# Patient Record
Sex: MALE | Race: WHITE | NOT HISPANIC OR LATINO | Employment: FULL TIME | URBAN - METROPOLITAN AREA
[De-identification: names, ages, dates, MRNs, and addresses within clinical notes are randomized per-mention and may not be internally consistent; named-entity substitution may affect disease eponyms.]

---

## 2018-08-22 ENCOUNTER — OFFICE VISIT (OUTPATIENT)
Dept: FAMILY MEDICINE | Age: 38
End: 2018-08-22

## 2018-08-22 VITALS
SYSTOLIC BLOOD PRESSURE: 110 MMHG | DIASTOLIC BLOOD PRESSURE: 80 MMHG | HEIGHT: 71 IN | HEART RATE: 104 BPM | BODY MASS INDEX: 32.78 KG/M2 | WEIGHT: 234.13 LBS

## 2018-08-22 DIAGNOSIS — F51.04 CHRONIC INSOMNIA: ICD-10-CM

## 2018-08-22 DIAGNOSIS — I10 ESSENTIAL HYPERTENSION: ICD-10-CM

## 2018-08-22 DIAGNOSIS — Z13.220 SCREENING CHOLESTEROL LEVEL: ICD-10-CM

## 2018-08-22 DIAGNOSIS — Z00.00 ROUTINE GENERAL MEDICAL EXAMINATION AT A HEALTH CARE FACILITY: Primary | ICD-10-CM

## 2018-08-22 PROCEDURE — 99385 PREV VISIT NEW AGE 18-39: CPT | Performed by: FAMILY MEDICINE

## 2018-08-22 RX ORDER — ERGOCALCIFEROL 1.25 MG/1
5000 CAPSULE ORAL DAILY
COMMUNITY
End: 2018-12-12 | Stop reason: ALTCHOICE

## 2018-08-22 RX ORDER — AMITRIPTYLINE HYDROCHLORIDE 25 MG/1
25 TABLET, FILM COATED ORAL NIGHTLY
COMMUNITY
End: 2018-09-19 | Stop reason: SDUPTHER

## 2018-08-22 RX ORDER — LISINOPRIL 10 MG/1
10 TABLET ORAL DAILY
COMMUNITY
End: 2019-04-30 | Stop reason: SDUPTHER

## 2018-08-22 RX ORDER — PAROXETINE 10 MG/1
10 TABLET, FILM COATED ORAL EVERY MORNING
Qty: 30 TABLET | Refills: 0 | Status: SHIPPED | OUTPATIENT
Start: 2018-08-22 | End: 2018-09-19 | Stop reason: SDUPTHER

## 2018-08-22 RX ORDER — MAGNESIUM GLUCONATE 27 MG(500)
500 TABLET ORAL 2 TIMES DAILY
COMMUNITY
End: 2018-12-12 | Stop reason: CLARIF

## 2018-08-29 ENCOUNTER — LAB SERVICES (OUTPATIENT)
Dept: LAB | Age: 38
End: 2018-08-29

## 2018-08-29 DIAGNOSIS — Z13.220 SCREENING CHOLESTEROL LEVEL: ICD-10-CM

## 2018-08-29 DIAGNOSIS — I10 ESSENTIAL HYPERTENSION: ICD-10-CM

## 2018-08-29 LAB
ALBUMIN SERPL-MCNC: 4 G/DL (ref 3.6–5.1)
ALBUMIN/GLOB SERPL: 1.2 {RATIO} (ref 1–2.4)
ALP SERPL-CCNC: 96 UNITS/L (ref 45–117)
ALT SERPL-CCNC: 70 UNITS/L
ANION GAP SERPL CALC-SCNC: 12 MMOL/L (ref 10–20)
APPEARANCE UR: CLEAR
AST SERPL-CCNC: 27 UNITS/L
BILIRUB SERPL-MCNC: 0.6 MG/DL (ref 0.2–1)
BILIRUB UR QL STRIP: NEGATIVE
BUN SERPL-MCNC: 16 MG/DL (ref 6–20)
BUN/CREAT SERPL: 16 (ref 7–25)
CALCIUM SERPL-MCNC: 8.8 MG/DL (ref 8.4–10.2)
CHLORIDE SERPL-SCNC: 104 MMOL/L (ref 98–107)
CHOLEST SERPL-MCNC: 245 MG/DL
CHOLEST/HDLC SERPL: 5.8 {RATIO}
CO2 SERPL-SCNC: 28 MMOL/L (ref 21–32)
COLOR UR: YELLOW
CREAT SERPL-MCNC: 1.03 MG/DL (ref 0.67–1.17)
FASTING STATUS PATIENT QL REPORTED: 12 HRS
GLOBULIN SER-MCNC: 3.2 G/DL (ref 2–4)
GLUCOSE SERPL-MCNC: 86 MG/DL (ref 65–99)
GLUCOSE UR STRIP-MCNC: NEGATIVE MG/DL
HDLC SERPL-MCNC: 42 MG/DL
HGB UR QL STRIP: NEGATIVE
KETONES UR STRIP-MCNC: NEGATIVE MG/DL
LDLC SERPL-MCNC: 166 MG/DL
LENGTH OF FAST TIME PATIENT: 12 HRS
LEUKOCYTE ESTERASE UR QL STRIP: NEGATIVE
NITRITE UR QL STRIP: NEGATIVE
NONHDLC SERPL-MCNC: 203 MG/DL
PH UR STRIP: 7 UNITS (ref 5–7)
POTASSIUM SERPL-SCNC: 4.5 MMOL/L (ref 3.4–5.1)
PROT SERPL-MCNC: 7.2 G/DL (ref 6.4–8.2)
PROT UR STRIP-MCNC: NEGATIVE MG/DL
SODIUM SERPL-SCNC: 139 MMOL/L (ref 135–145)
SP GR UR STRIP: 1.02 (ref 1–1.03)
SPECIMEN SOURCE: NORMAL
TRIGL SERPL-MCNC: 184 MG/DL
TSH SERPL-ACNC: 1.27 MCUNITS/ML (ref 0.35–5)
UROBILINOGEN UR STRIP-MCNC: 0.2 MG/DL (ref 0–1)

## 2018-08-29 PROCEDURE — 81003 URINALYSIS AUTO W/O SCOPE: CPT | Performed by: INTERNAL MEDICINE

## 2018-08-29 PROCEDURE — 36415 COLL VENOUS BLD VENIPUNCTURE: CPT | Performed by: INTERNAL MEDICINE

## 2018-09-19 ENCOUNTER — OFFICE VISIT (OUTPATIENT)
Dept: FAMILY MEDICINE | Age: 38
End: 2018-09-19

## 2018-09-19 VITALS
SYSTOLIC BLOOD PRESSURE: 106 MMHG | BODY MASS INDEX: 32.49 KG/M2 | WEIGHT: 234.57 LBS | HEART RATE: 100 BPM | DIASTOLIC BLOOD PRESSURE: 80 MMHG

## 2018-09-19 DIAGNOSIS — F51.04 CHRONIC INSOMNIA: ICD-10-CM

## 2018-09-19 DIAGNOSIS — I10 ESSENTIAL HYPERTENSION: Primary | ICD-10-CM

## 2018-09-19 DIAGNOSIS — E78.00 PURE HYPERCHOLESTEROLEMIA: ICD-10-CM

## 2018-09-19 PROCEDURE — 99213 OFFICE O/P EST LOW 20 MIN: CPT | Performed by: FAMILY MEDICINE

## 2018-09-19 RX ORDER — AMITRIPTYLINE HYDROCHLORIDE 100 MG/1
100 TABLET ORAL NIGHTLY
Qty: 90 TABLET | Refills: 0 | Status: SHIPPED | OUTPATIENT
Start: 2018-09-19 | End: 2018-11-19 | Stop reason: SDUPTHER

## 2018-09-19 RX ORDER — PAROXETINE HYDROCHLORIDE 20 MG/1
20 TABLET, FILM COATED ORAL EVERY MORNING
Qty: 30 TABLET | Refills: 2 | Status: SHIPPED | OUTPATIENT
Start: 2018-09-19 | End: 2018-12-12 | Stop reason: ALTCHOICE

## 2018-09-26 ENCOUNTER — WALK IN (OUTPATIENT)
Dept: URGENT CARE | Age: 38
End: 2018-09-26

## 2018-09-26 VITALS
WEIGHT: 235.89 LBS | HEART RATE: 108 BPM | OXYGEN SATURATION: 97 % | TEMPERATURE: 97.5 F | BODY MASS INDEX: 32.67 KG/M2 | DIASTOLIC BLOOD PRESSURE: 79 MMHG | SYSTOLIC BLOOD PRESSURE: 130 MMHG

## 2018-09-26 DIAGNOSIS — L03.011 PARONYCHIA OF FINGER OF RIGHT HAND: Primary | ICD-10-CM

## 2018-09-26 PROCEDURE — 99213 OFFICE O/P EST LOW 20 MIN: CPT | Performed by: NURSE PRACTITIONER

## 2018-09-26 RX ORDER — AMOXICILLIN AND CLAVULANATE POTASSIUM 875; 125 MG/1; MG/1
1 TABLET, FILM COATED ORAL 2 TIMES DAILY
Qty: 20 TABLET | Refills: 0 | Status: SHIPPED | OUTPATIENT
Start: 2018-09-26 | End: 2018-12-12 | Stop reason: CLARIF

## 2018-11-20 RX ORDER — AMITRIPTYLINE HYDROCHLORIDE 100 MG/1
100 TABLET ORAL NIGHTLY
Qty: 90 TABLET | Refills: 0 | Status: SHIPPED | OUTPATIENT
Start: 2018-11-20 | End: 2018-12-12 | Stop reason: SDUPTHER

## 2018-12-12 ENCOUNTER — OFFICE VISIT (OUTPATIENT)
Dept: FAMILY MEDICINE | Age: 38
End: 2018-12-12

## 2018-12-12 VITALS
BODY MASS INDEX: 33.92 KG/M2 | DIASTOLIC BLOOD PRESSURE: 80 MMHG | SYSTOLIC BLOOD PRESSURE: 110 MMHG | WEIGHT: 244.93 LBS | HEART RATE: 92 BPM

## 2018-12-12 DIAGNOSIS — I10 ESSENTIAL HYPERTENSION: Primary | ICD-10-CM

## 2018-12-12 DIAGNOSIS — F51.04 CHRONIC INSOMNIA: ICD-10-CM

## 2018-12-12 PROCEDURE — 99213 OFFICE O/P EST LOW 20 MIN: CPT | Performed by: FAMILY MEDICINE

## 2018-12-12 RX ORDER — CHOLECALCIFEROL (VITAMIN D3) 50 MCG
2000 TABLET ORAL DAILY
Qty: 100 TABLET | Refills: 0 | Status: SHIPPED | COMMUNITY
End: 2019-12-13 | Stop reason: ALTCHOICE

## 2018-12-12 RX ORDER — ESZOPICLONE 2 MG/1
2 TABLET, FILM COATED ORAL
Qty: 30 TABLET | Refills: 0 | Status: SHIPPED | OUTPATIENT
Start: 2018-12-12 | End: 2019-01-14 | Stop reason: SDUPTHER

## 2018-12-12 RX ORDER — AMITRIPTYLINE HYDROCHLORIDE 50 MG/1
50 TABLET, FILM COATED ORAL NIGHTLY
Qty: 30 TABLET | Refills: 5 | Status: SHIPPED | OUTPATIENT
Start: 2018-12-12 | End: 2019-06-12 | Stop reason: ALTCHOICE

## 2018-12-12 RX ORDER — ESCITALOPRAM OXALATE 10 MG/1
10 TABLET ORAL DAILY
Qty: 30 TABLET | Refills: 5 | Status: SHIPPED | OUTPATIENT
Start: 2018-12-12 | End: 2019-02-08 | Stop reason: SDUPTHER

## 2019-01-01 ENCOUNTER — EXTERNAL RECORD (OUTPATIENT)
Dept: OTHER | Age: 39
End: 2019-01-01

## 2019-01-14 ENCOUNTER — TELEPHONE (OUTPATIENT)
Dept: FAMILY MEDICINE | Age: 39
End: 2019-01-14

## 2019-01-14 RX ORDER — ESZOPICLONE 2 MG/1
2 TABLET, FILM COATED ORAL
Qty: 30 TABLET | Refills: 1 | Status: SHIPPED | OUTPATIENT
Start: 2019-01-14 | End: 2019-03-14 | Stop reason: SDUPTHER

## 2019-01-22 RX ORDER — ESZOPICLONE 2 MG/1
TABLET, FILM COATED ORAL
Qty: 30 TABLET | Refills: 0 | OUTPATIENT
Start: 2019-01-22

## 2019-02-07 ENCOUNTER — TELEPHONE (OUTPATIENT)
Dept: FAMILY MEDICINE | Age: 39
End: 2019-02-07

## 2019-02-08 RX ORDER — ESCITALOPRAM OXALATE 20 MG/1
20 TABLET ORAL DAILY
Qty: 30 TABLET | Refills: 2 | Status: SHIPPED | OUTPATIENT
Start: 2019-02-08 | End: 2019-05-13 | Stop reason: SDUPTHER

## 2019-03-12 ENCOUNTER — TELEPHONE (OUTPATIENT)
Dept: FAMILY MEDICINE | Age: 39
End: 2019-03-12

## 2019-03-14 ENCOUNTER — OFFICE VISIT (OUTPATIENT)
Dept: FAMILY MEDICINE | Age: 39
End: 2019-03-14

## 2019-03-14 ENCOUNTER — IMAGING SERVICES (OUTPATIENT)
Dept: GENERAL RADIOLOGY | Age: 39
End: 2019-03-14
Attending: FAMILY MEDICINE

## 2019-03-14 VITALS
WEIGHT: 234.13 LBS | HEART RATE: 96 BPM | DIASTOLIC BLOOD PRESSURE: 86 MMHG | BODY MASS INDEX: 32.43 KG/M2 | SYSTOLIC BLOOD PRESSURE: 120 MMHG

## 2019-03-14 DIAGNOSIS — G89.29 CHRONIC PAIN OF LEFT ELBOW: ICD-10-CM

## 2019-03-14 DIAGNOSIS — M25.522 CHRONIC PAIN OF LEFT ELBOW: ICD-10-CM

## 2019-03-14 DIAGNOSIS — F51.04 CHRONIC INSOMNIA: ICD-10-CM

## 2019-03-14 DIAGNOSIS — M25.522 CHRONIC PAIN OF LEFT ELBOW: Primary | ICD-10-CM

## 2019-03-14 DIAGNOSIS — G89.29 CHRONIC PAIN OF LEFT ELBOW: Primary | ICD-10-CM

## 2019-03-14 PROCEDURE — 99213 OFFICE O/P EST LOW 20 MIN: CPT | Performed by: FAMILY MEDICINE

## 2019-03-14 PROCEDURE — 73080 X-RAY EXAM OF ELBOW: CPT | Performed by: RADIOLOGY

## 2019-03-14 RX ORDER — ESZOPICLONE 3 MG/1
3 TABLET, FILM COATED ORAL
Qty: 30 TABLET | Refills: 0 | Status: SHIPPED | OUTPATIENT
Start: 2019-03-14 | End: 2019-04-11 | Stop reason: SDUPTHER

## 2019-03-14 ASSESSMENT — PATIENT HEALTH QUESTIONNAIRE - PHQ9
1. LITTLE INTEREST OR PLEASURE IN DOING THINGS: NOT AT ALL
2. FEELING DOWN, DEPRESSED OR HOPELESS: NOT AT ALL
SUM OF ALL RESPONSES TO PHQ9 QUESTIONS 1 AND 2: 0
SUM OF ALL RESPONSES TO PHQ9 QUESTIONS 1 AND 2: 0

## 2019-04-01 ENCOUNTER — TELEPHONE (OUTPATIENT)
Dept: FAMILY MEDICINE | Age: 39
End: 2019-04-01

## 2019-04-01 DIAGNOSIS — M77.8 TENDINITIS OF ELBOW: Primary | ICD-10-CM

## 2019-04-10 ENCOUNTER — APPOINTMENT (OUTPATIENT)
Dept: REHABILITATION | Age: 39
End: 2019-04-10
Attending: FAMILY MEDICINE

## 2019-04-11 RX ORDER — ESZOPICLONE 3 MG/1
3 TABLET, FILM COATED ORAL
Qty: 30 TABLET | Refills: 1 | Status: SHIPPED | OUTPATIENT
Start: 2019-04-11 | End: 2019-06-12

## 2019-04-12 ENCOUNTER — APPOINTMENT (OUTPATIENT)
Dept: REHABILITATION | Age: 39
End: 2019-04-12
Attending: FAMILY MEDICINE

## 2019-04-15 ENCOUNTER — APPOINTMENT (OUTPATIENT)
Dept: REHABILITATION | Age: 39
End: 2019-04-15
Attending: FAMILY MEDICINE

## 2019-04-18 ENCOUNTER — APPOINTMENT (OUTPATIENT)
Dept: REHABILITATION | Age: 39
End: 2019-04-18
Attending: FAMILY MEDICINE

## 2019-04-30 RX ORDER — LISINOPRIL 10 MG/1
10 TABLET ORAL DAILY
Qty: 30 TABLET | Refills: 2 | Status: SHIPPED | OUTPATIENT
Start: 2019-04-30 | End: 2019-07-27 | Stop reason: SDUPTHER

## 2019-05-13 RX ORDER — ESCITALOPRAM OXALATE 20 MG/1
20 TABLET ORAL DAILY
Qty: 30 TABLET | Refills: 0 | Status: SHIPPED | OUTPATIENT
Start: 2019-05-13 | End: 2019-06-12

## 2019-05-31 ENCOUNTER — HOSPITAL ENCOUNTER (OUTPATIENT)
Dept: REHABILITATION | Age: 39
Discharge: STILL ADMITTED OR ADMINISTRATIVE | End: 2019-05-31
Attending: FAMILY MEDICINE

## 2019-05-31 PROCEDURE — 10004172 HB COUNTER-THERAPY VISIT OT: Performed by: OCCUPATIONAL THERAPIST

## 2019-05-31 PROCEDURE — 97166 OT EVAL MOD COMPLEX 45 MIN: CPT | Performed by: OCCUPATIONAL THERAPIST

## 2019-06-04 ENCOUNTER — HOSPITAL ENCOUNTER (OUTPATIENT)
Dept: REHABILITATION | Age: 39
Discharge: STILL ADMITTED OR ADMINISTRATIVE | End: 2019-06-04
Attending: FAMILY MEDICINE

## 2019-06-04 PROCEDURE — 97033 APP MDLTY 1+IONTPHRSIS EA 15: CPT | Performed by: OCCUPATIONAL THERAPIST

## 2019-06-04 PROCEDURE — 10004172 HB COUNTER-THERAPY VISIT OT: Performed by: OCCUPATIONAL THERAPIST

## 2019-06-04 PROCEDURE — 97110 THERAPEUTIC EXERCISES: CPT | Performed by: OCCUPATIONAL THERAPIST

## 2019-06-04 PROCEDURE — 97035 APP MDLTY 1+ULTRASOUND EA 15: CPT | Performed by: OCCUPATIONAL THERAPIST

## 2019-06-04 PROCEDURE — 97140 MANUAL THERAPY 1/> REGIONS: CPT | Performed by: OCCUPATIONAL THERAPIST

## 2019-06-06 ENCOUNTER — LAB SERVICES (OUTPATIENT)
Dept: LAB | Age: 39
End: 2019-06-06

## 2019-06-06 ENCOUNTER — HOSPITAL ENCOUNTER (OUTPATIENT)
Dept: REHABILITATION | Age: 39
Discharge: STILL ADMITTED OR ADMINISTRATIVE | End: 2019-06-06
Attending: FAMILY MEDICINE

## 2019-06-06 DIAGNOSIS — I10 ESSENTIAL HYPERTENSION: ICD-10-CM

## 2019-06-06 LAB
ANION GAP SERPL CALC-SCNC: 13 MMOL/L (ref 10–20)
APPEARANCE UR: CLEAR
BILIRUB UR QL STRIP: NEGATIVE
BUN SERPL-MCNC: 14 MG/DL (ref 6–20)
BUN/CREAT SERPL: 14 (ref 7–25)
CALCIUM SERPL-MCNC: 9.5 MG/DL (ref 8.4–10.2)
CHLORIDE SERPL-SCNC: 105 MMOL/L (ref 98–107)
CHOLEST SERPL-MCNC: 248 MG/DL
CHOLEST/HDLC SERPL: 5.6 {RATIO}
CO2 SERPL-SCNC: 28 MMOL/L (ref 21–32)
COLOR UR: YELLOW
CREAT SERPL-MCNC: 1.02 MG/DL (ref 0.67–1.17)
FASTING STATUS PATIENT QL REPORTED: 13 HRS
GLUCOSE SERPL-MCNC: 90 MG/DL (ref 65–99)
GLUCOSE UR STRIP-MCNC: NEGATIVE MG/DL
HDLC SERPL-MCNC: 44 MG/DL
HGB UR QL STRIP: NEGATIVE
KETONES UR STRIP-MCNC: NEGATIVE MG/DL
LDLC SERPL-MCNC: 173 MG/DL
LENGTH OF FAST TIME PATIENT: 13 HRS
LEUKOCYTE ESTERASE UR QL STRIP: NEGATIVE
NITRITE UR QL STRIP: NEGATIVE
NONHDLC SERPL-MCNC: 204 MG/DL
PH UR STRIP: 5.5 UNITS (ref 5–7)
POTASSIUM SERPL-SCNC: 4.4 MMOL/L (ref 3.4–5.1)
PROT UR STRIP-MCNC: NEGATIVE MG/DL
SODIUM SERPL-SCNC: 142 MMOL/L (ref 135–145)
SP GR UR STRIP: 1.02 (ref 1–1.03)
SPECIMEN SOURCE: NORMAL
TRIGL SERPL-MCNC: 156 MG/DL
UROBILINOGEN UR STRIP-MCNC: 0.2 MG/DL (ref 0–1)

## 2019-06-06 PROCEDURE — 97033 APP MDLTY 1+IONTPHRSIS EA 15: CPT | Performed by: OCCUPATIONAL THERAPIST

## 2019-06-06 PROCEDURE — 97110 THERAPEUTIC EXERCISES: CPT | Performed by: OCCUPATIONAL THERAPIST

## 2019-06-06 PROCEDURE — 10004172 HB COUNTER-THERAPY VISIT OT: Performed by: OCCUPATIONAL THERAPIST

## 2019-06-06 PROCEDURE — 36415 COLL VENOUS BLD VENIPUNCTURE: CPT | Performed by: INTERNAL MEDICINE

## 2019-06-06 PROCEDURE — 97140 MANUAL THERAPY 1/> REGIONS: CPT | Performed by: OCCUPATIONAL THERAPIST

## 2019-06-06 PROCEDURE — 97035 APP MDLTY 1+ULTRASOUND EA 15: CPT | Performed by: OCCUPATIONAL THERAPIST

## 2019-06-11 ENCOUNTER — HOSPITAL ENCOUNTER (OUTPATIENT)
Dept: REHABILITATION | Age: 39
Discharge: STILL ADMITTED OR ADMINISTRATIVE | End: 2019-06-11
Attending: FAMILY MEDICINE

## 2019-06-11 PROCEDURE — 97110 THERAPEUTIC EXERCISES: CPT | Performed by: OCCUPATIONAL THERAPIST

## 2019-06-11 PROCEDURE — 97140 MANUAL THERAPY 1/> REGIONS: CPT | Performed by: OCCUPATIONAL THERAPIST

## 2019-06-11 PROCEDURE — 97033 APP MDLTY 1+IONTPHRSIS EA 15: CPT | Performed by: OCCUPATIONAL THERAPIST

## 2019-06-11 PROCEDURE — 97035 APP MDLTY 1+ULTRASOUND EA 15: CPT | Performed by: OCCUPATIONAL THERAPIST

## 2019-06-11 PROCEDURE — 10004172 HB COUNTER-THERAPY VISIT OT: Performed by: OCCUPATIONAL THERAPIST

## 2019-06-12 ENCOUNTER — TELEPHONE (OUTPATIENT)
Dept: FAMILY MEDICINE | Age: 39
End: 2019-06-12

## 2019-06-12 ENCOUNTER — OFFICE VISIT (OUTPATIENT)
Dept: FAMILY MEDICINE | Age: 39
End: 2019-06-12

## 2019-06-12 VITALS
BODY MASS INDEX: 31.6 KG/M2 | WEIGHT: 228.18 LBS | SYSTOLIC BLOOD PRESSURE: 114 MMHG | DIASTOLIC BLOOD PRESSURE: 80 MMHG | HEART RATE: 72 BPM

## 2019-06-12 DIAGNOSIS — I10 ESSENTIAL HYPERTENSION: Primary | ICD-10-CM

## 2019-06-12 DIAGNOSIS — E78.00 PURE HYPERCHOLESTEROLEMIA: ICD-10-CM

## 2019-06-12 DIAGNOSIS — F51.04 CHRONIC INSOMNIA: ICD-10-CM

## 2019-06-12 PROCEDURE — 99213 OFFICE O/P EST LOW 20 MIN: CPT | Performed by: FAMILY MEDICINE

## 2019-06-12 RX ORDER — NORTRIPTYLINE HYDROCHLORIDE 10 MG/1
CAPSULE ORAL
Qty: 60 CAPSULE | Refills: 0 | Status: SHIPPED | OUTPATIENT
Start: 2019-06-12 | End: 2019-06-25 | Stop reason: SDUPTHER

## 2019-06-12 RX ORDER — NORTRIPTYLINE HYDROCHLORIDE 10 MG/1
CAPSULE ORAL
Qty: 60 CAPSULE | Refills: 0 | Status: CANCELLED | OUTPATIENT
Start: 2019-06-12

## 2019-06-12 RX ORDER — NORTRIPTYLINE HYDROCHLORIDE 10 MG/1
CAPSULE ORAL
Qty: 60 CAPSULE | Refills: 0 | Status: SHIPPED | OUTPATIENT
Start: 2019-06-12 | End: 2019-06-12 | Stop reason: SDUPTHER

## 2019-06-13 ENCOUNTER — HOSPITAL ENCOUNTER (OUTPATIENT)
Dept: REHABILITATION | Age: 39
Discharge: STILL ADMITTED OR ADMINISTRATIVE | End: 2019-06-13
Attending: FAMILY MEDICINE

## 2019-06-13 PROCEDURE — 10004172 HB COUNTER-THERAPY VISIT OT: Performed by: OCCUPATIONAL THERAPIST

## 2019-06-13 PROCEDURE — 97140 MANUAL THERAPY 1/> REGIONS: CPT | Performed by: OCCUPATIONAL THERAPIST

## 2019-06-13 PROCEDURE — 97110 THERAPEUTIC EXERCISES: CPT | Performed by: OCCUPATIONAL THERAPIST

## 2019-06-13 PROCEDURE — 97033 APP MDLTY 1+IONTPHRSIS EA 15: CPT | Performed by: OCCUPATIONAL THERAPIST

## 2019-06-13 PROCEDURE — 97035 APP MDLTY 1+ULTRASOUND EA 15: CPT | Performed by: OCCUPATIONAL THERAPIST

## 2019-06-14 RX ORDER — NORTRIPTYLINE HYDROCHLORIDE 10 MG/1
CAPSULE ORAL
Qty: 60 CAPSULE | Refills: 0 | OUTPATIENT
Start: 2019-06-14

## 2019-06-17 RX ORDER — ESZOPICLONE 2 MG/1
2 TABLET, FILM COATED ORAL
Qty: 30 TABLET | Refills: 0 | Status: SHIPPED | OUTPATIENT
Start: 2019-06-17 | End: 2019-12-13 | Stop reason: ALTCHOICE

## 2019-06-17 RX ORDER — ESZOPICLONE 3 MG/1
TABLET, FILM COATED ORAL
Qty: 30 TABLET | Refills: 0 | OUTPATIENT
Start: 2019-06-17

## 2019-06-25 ENCOUNTER — TELEPHONE (OUTPATIENT)
Dept: FAMILY MEDICINE | Age: 39
End: 2019-06-25

## 2019-06-25 RX ORDER — NORTRIPTYLINE HYDROCHLORIDE 25 MG/1
CAPSULE ORAL
Qty: 60 CAPSULE | Refills: 2 | Status: SHIPPED | OUTPATIENT
Start: 2019-06-25 | End: 2019-07-26 | Stop reason: SDUPTHER

## 2019-07-25 ENCOUNTER — TELEPHONE (OUTPATIENT)
Dept: FAMILY MEDICINE | Age: 39
End: 2019-07-25

## 2019-07-26 RX ORDER — NORTRIPTYLINE HYDROCHLORIDE 50 MG/1
100 CAPSULE ORAL NIGHTLY
Qty: 60 CAPSULE | Refills: 2 | Status: SHIPPED | OUTPATIENT
Start: 2019-07-26 | End: 2019-08-20 | Stop reason: SDUPTHER

## 2019-07-26 RX ORDER — NORTRIPTYLINE HYDROCHLORIDE 25 MG/1
50 CAPSULE ORAL NIGHTLY
Qty: 60 CAPSULE | Refills: 2 | Status: SHIPPED | OUTPATIENT
Start: 2019-07-26 | End: 2019-07-26 | Stop reason: DRUGHIGH

## 2019-07-29 RX ORDER — LISINOPRIL 10 MG/1
10 TABLET ORAL DAILY
Qty: 30 TABLET | Refills: 5 | Status: SHIPPED | OUTPATIENT
Start: 2019-07-29 | End: 2019-12-13 | Stop reason: SDUPTHER

## 2019-08-20 RX ORDER — NORTRIPTYLINE HYDROCHLORIDE 50 MG/1
100 CAPSULE ORAL NIGHTLY
Qty: 60 CAPSULE | Refills: 2 | Status: SHIPPED | OUTPATIENT
Start: 2019-08-20 | End: 2019-11-20 | Stop reason: SDUPTHER

## 2019-09-16 ENCOUNTER — OFFICE VISIT (OUTPATIENT)
Dept: PULMONOLOGY | Age: 39
End: 2019-09-16

## 2019-09-16 VITALS
WEIGHT: 225.8 LBS | SYSTOLIC BLOOD PRESSURE: 120 MMHG | HEART RATE: 91 BPM | BODY MASS INDEX: 31.61 KG/M2 | OXYGEN SATURATION: 94 % | HEIGHT: 71 IN | DIASTOLIC BLOOD PRESSURE: 80 MMHG

## 2019-09-16 DIAGNOSIS — G47.10 HYPERSOMNIA: ICD-10-CM

## 2019-09-16 DIAGNOSIS — E66.9 OBESITY (BMI 30-39.9): ICD-10-CM

## 2019-09-16 DIAGNOSIS — F51.04 CHRONIC INSOMNIA: ICD-10-CM

## 2019-09-16 DIAGNOSIS — G47.8 NON-RESTORATIVE SLEEP: ICD-10-CM

## 2019-09-16 DIAGNOSIS — G47.33 OSA (OBSTRUCTIVE SLEEP APNEA): Primary | ICD-10-CM

## 2019-09-16 DIAGNOSIS — G47.9 RESTLESS SLEEPER: ICD-10-CM

## 2019-09-16 PROCEDURE — 99244 OFF/OP CNSLTJ NEW/EST MOD 40: CPT | Performed by: INTERNAL MEDICINE

## 2019-09-16 ASSESSMENT — SLEEP AND FATIGUE QUESTIONNAIRES
HOW LIKELY ARE YOU TO NOD OFF OR FALL ASLEEP WHILE SITTING AND READING: 3
HOW LIKELY ARE YOU TO NOD OFF OR FALL ASLEEP WHILE SITTING INACTIVE IN A PUBLIC PLACE: 0
HOW LIKELY ARE YOU TO NOD OFF OR FALL ASLEEP WHILE SITTING QUIETLY AFTER LUNCH WITHOUT ALCOHOL: 2
HOW LIKELY ARE YOU TO NOD OFF OR FALL ASLEEP WHILE SITTING AND TALKING TO SOMEONE: 0
HOW LIKELY ARE YOU TO NOD OFF OR FALL ASLEEP WHILE LYING DOWN TO REST IN THE AFTERNOON WHEN CIRCUMSTANCES PERMIT: 3
HOW LIKELY ARE YOU TO NOD OFF OR FALL ASLEEP WHILE WATCHING TV: 1
HOW LIKELY ARE YOU TO NOD OFF OR FALL ASLEEP IN A CAR, WHILE STOPPED FOR A FEW MINUTES IN TRAFFIC: 0
ESS TOTAL SCORE: 10
HOW LIKELY ARE YOU TO NOD OFF OR FALL ASLEEP WHEN YOU ARE A PASSENGER IN A CAR FOR AN HOUR WITHOUT A BREAK: 1

## 2019-09-30 ENCOUNTER — TELEPHONE (OUTPATIENT)
Dept: SLEEP MEDICINE | Age: 39
End: 2019-09-30

## 2019-11-14 ENCOUNTER — HOSPITAL ENCOUNTER (OUTPATIENT)
Dept: SLEEP MEDICINE | Age: 39
Discharge: STILL ADMITTED OR ADMINISTRATIVE | End: 2019-11-14
Attending: INTERNAL MEDICINE

## 2019-11-14 DIAGNOSIS — G47.10 HYPERSOMNIA: ICD-10-CM

## 2019-11-14 DIAGNOSIS — E66.9 OBESITY (BMI 30-39.9): ICD-10-CM

## 2019-11-14 DIAGNOSIS — G47.8 NON-RESTORATIVE SLEEP: ICD-10-CM

## 2019-11-14 DIAGNOSIS — G47.9 RESTLESS SLEEPER: ICD-10-CM

## 2019-11-14 DIAGNOSIS — F51.04 CHRONIC INSOMNIA: ICD-10-CM

## 2019-11-14 PROCEDURE — 95810 POLYSOM 6/> YRS 4/> PARAM: CPT | Performed by: INTERNAL MEDICINE

## 2019-11-14 PROCEDURE — 95810 POLYSOM 6/> YRS 4/> PARAM: CPT

## 2019-11-14 ASSESSMENT — ACTIVITIES OF DAILY LIVING (ADL)
RECENT_DECLINE_ADL: NO
ADL_SHORT_OF_BREATH: NO

## 2019-11-15 VITALS — HEIGHT: 72 IN | WEIGHT: 220 LBS | BODY MASS INDEX: 29.8 KG/M2

## 2019-11-15 ASSESSMENT — SLEEP AND FATIGUE QUESTIONNAIRES
WHAT TIME DID YOU HAVE THE CAFFEINE: 21000
HOW LIKELY ARE YOU TO NOD OFF OR FALL ASLEEP WHILE SITTING INACTIVE IN A PUBLIC PLACE: WOULD NEVER DOZE
HOW DOES THIS COMPARE TO YOUR USUAL AMOUNT OF SLEEP AT HOME: SHORTER THAN
IF YES, NAME OF MEDICATION: 5 HOURS
HOW MUCH CAFFEINE DID YOU HAVE: 10OZ
HOW LIKELY ARE YOU TO NOD OFF OR FALL ASLEEP WHILE LYING DOWN TO REST IN THE AFTERNOON WHEN CIRCUMSTANCES PERMIT: MODERATE CHANCE OF DOZING
DO YOU HAVE A SOUR/BITTER TASTE IN YOUR MOUTH: YES
DID YOU TAKE ANY MEDICATIONS LAST NIGHT THAT MAKE YOU FEEL SLEEPY: YES
ESS TOTAL SCORE: 9
DO YOU FEEL AWAKE AND ALERT ENOUGH TO DRIVE HOME: YES
WHAT TIME DID YOU WAKE UP TODAY: 20700
HOW LIKELY ARE YOU TO NOD OFF OR FALL ASLEEP WHILE WATCHING TV: SLIGHT CHANCE OF DOZING
DID YOU DRINK ANY ALCOHOL TODAY: NO
DID YOU DREAM LAST NIGHT: NO
HOW LIKELY ARE YOU TO NOD OFF OR FALL ASLEEP IN A CAR, WHILE STOPPED FOR A FEW MINUTES IN TRAFFIC: WOULD NEVER DOZE
HOW DOES YOUR SLEEP LAST NIGHT COMPARE TO YOUR USUAL SLEEP AT HOME: WORSE THAN
HOW LIKELY ARE YOU TO NOD OFF OR FALL ASLEEP WHEN YOU ARE A PASSENGER IN A CAR FOR AN HOUR WITHOUT A BREAK: SLIGHT CHANCE OF DOZING
HAS TODAY BEEN AN UNUSUAL DAY IN ANY RESPECT: NO
DID YOU FEEL SLEEPY TODAY: YES
HOW LIKELY ARE YOU TO NOD OFF OR FALL ASLEEP WHILE SITTING AND READING: MODERATE CHANCE OF DOZING
DID YOU HAVE ANY PHYSICAL EXERCISE TODAY: NO
WHEN DID YOU FEEL SLEEPY: 11:15AM
HAVE YOU RECENTLY TAKEN ANY MEDICATIONS THAT MAKE YOU FEEL SLEEPY: NO
DID YOU HAVE ANY CAFFEINE TODAY: YES
IS YOUR NOSE OR MOUTH DRY: YES
DID YOU HAVE DIFFICULTY FALLING BACK TO SLEEP: YES
HOW LIKELY ARE YOU TO NOD OFF OR FALL ASLEEP WHILE SITTING QUIETLY AFTER LUNCH WITHOUT ALCOHOL: HIGH CHANCE OF DOZING
HOW DEEPLY DID YOU SLEEP LAST NIGHT: LIGHT
DO YOU HAVE ANY PHYSICAL COMPLAINTS RIGHT NOW: NO
NUMBER OF TIMES YOU WOKE UP IN THE NIGHT: 3
HOW SLEEPY DO YOU FEEL RIGHT NOW: QUITE A BIT
HOW LIKELY ARE YOU TO NOD OFF OR FALL ASLEEP WHILE SITTING AND TALKING TO SOMEONE: WOULD NEVER DOZE
DID YOU HAVE DIFFICULTY FALLING ASLEEP LAST NIGHT: YES
HOW LONG DID IT TAKE TO FALL ASLEEP LAST NIGHT (HRS/MIN): 5
HOW MANY NAPS DID YOU TAKE TODAY: 1
DID YOU TAKE A NAP TODAY: YES
DID YOU WAKE UP DURING THE NIGHT: YES
HOW MUCH SLEEP DID YOU HAVE LAST NIGHT (HRS/MIN): 6 HOURS

## 2019-11-20 ENCOUNTER — OFFICE VISIT (OUTPATIENT)
Dept: PULMONOLOGY | Age: 39
End: 2019-11-20

## 2019-11-20 VITALS — WEIGHT: 220.02 LBS | HEIGHT: 72 IN | BODY MASS INDEX: 29.8 KG/M2 | OXYGEN SATURATION: 99 % | HEART RATE: 106 BPM

## 2019-11-20 DIAGNOSIS — G47.33 MILD OBSTRUCTIVE SLEEP APNEA: Primary | ICD-10-CM

## 2019-11-20 DIAGNOSIS — F51.04 CHRONIC INSOMNIA: ICD-10-CM

## 2019-11-20 DIAGNOSIS — Z71.2 ENCOUNTER TO DISCUSS TEST RESULTS: ICD-10-CM

## 2019-11-20 DIAGNOSIS — G47.10 HYPERSOMNIA: ICD-10-CM

## 2019-11-20 PROCEDURE — 99214 OFFICE O/P EST MOD 30 MIN: CPT | Performed by: INTERNAL MEDICINE

## 2019-11-20 RX ORDER — NORTRIPTYLINE HYDROCHLORIDE 50 MG/1
100 CAPSULE ORAL NIGHTLY
Qty: 60 CAPSULE | Refills: 0 | Status: SHIPPED | OUTPATIENT
Start: 2019-11-20 | End: 2019-12-13 | Stop reason: SDUPTHER

## 2019-11-20 ASSESSMENT — SLEEP AND FATIGUE QUESTIONNAIRES
ESS TOTAL SCORE: 10
HOW LIKELY ARE YOU TO NOD OFF OR FALL ASLEEP WHEN YOU ARE A PASSENGER IN A CAR FOR AN HOUR WITHOUT A BREAK: 1
HOW LIKELY ARE YOU TO NOD OFF OR FALL ASLEEP WHILE SITTING AND READING: 2
HOW LIKELY ARE YOU TO NOD OFF OR FALL ASLEEP WHILE SITTING INACTIVE IN A PUBLIC PLACE: 0
HOW LIKELY ARE YOU TO NOD OFF OR FALL ASLEEP IN A CAR, WHILE STOPPED FOR A FEW MINUTES IN TRAFFIC: 0
HOW LIKELY ARE YOU TO NOD OFF OR FALL ASLEEP WHILE LYING DOWN TO REST IN THE AFTERNOON WHEN CIRCUMSTANCES PERMIT: 3
HOW LIKELY ARE YOU TO NOD OFF OR FALL ASLEEP WHILE SITTING AND TALKING TO SOMEONE: 0
HOW LIKELY ARE YOU TO NOD OFF OR FALL ASLEEP WHILE WATCHING TV: 1
HOW LIKELY ARE YOU TO NOD OFF OR FALL ASLEEP WHILE SITTING QUIETLY AFTER LUNCH WITHOUT ALCOHOL: 3

## 2019-12-13 ENCOUNTER — OFFICE VISIT (OUTPATIENT)
Dept: FAMILY MEDICINE | Age: 39
End: 2019-12-13

## 2019-12-13 VITALS
WEIGHT: 231.15 LBS | OXYGEN SATURATION: 97 % | BODY MASS INDEX: 31.35 KG/M2 | SYSTOLIC BLOOD PRESSURE: 124 MMHG | DIASTOLIC BLOOD PRESSURE: 74 MMHG | HEART RATE: 98 BPM

## 2019-12-13 DIAGNOSIS — E78.00 PURE HYPERCHOLESTEROLEMIA: ICD-10-CM

## 2019-12-13 DIAGNOSIS — F51.04 CHRONIC INSOMNIA: ICD-10-CM

## 2019-12-13 DIAGNOSIS — I10 ESSENTIAL HYPERTENSION: Primary | ICD-10-CM

## 2019-12-13 PROCEDURE — 99213 OFFICE O/P EST LOW 20 MIN: CPT | Performed by: FAMILY MEDICINE

## 2019-12-13 RX ORDER — LISINOPRIL 10 MG/1
10 TABLET ORAL DAILY
Qty: 90 TABLET | Refills: 1 | Status: SHIPPED | OUTPATIENT
Start: 2019-12-13 | End: 2020-02-14 | Stop reason: SDUPTHER

## 2019-12-13 RX ORDER — NORTRIPTYLINE HYDROCHLORIDE 50 MG/1
100 CAPSULE ORAL NIGHTLY
Qty: 180 CAPSULE | Refills: 1 | Status: SHIPPED | OUTPATIENT
Start: 2019-12-13 | End: 2020-02-14 | Stop reason: SDUPTHER

## 2020-02-14 RX ORDER — LISINOPRIL 10 MG/1
10 TABLET ORAL DAILY
Qty: 90 TABLET | Refills: 0 | Status: SHIPPED | OUTPATIENT
Start: 2020-02-14 | End: 2020-05-27 | Stop reason: SDUPTHER

## 2020-02-14 RX ORDER — NORTRIPTYLINE HYDROCHLORIDE 50 MG/1
100 CAPSULE ORAL NIGHTLY
Qty: 180 CAPSULE | Refills: 0 | Status: SHIPPED | OUTPATIENT
Start: 2020-02-14 | End: 2020-05-27 | Stop reason: SDUPTHER

## 2020-05-27 RX ORDER — LISINOPRIL 10 MG/1
10 TABLET ORAL DAILY
Qty: 90 TABLET | Refills: 0 | Status: SHIPPED | OUTPATIENT
Start: 2020-05-27 | End: 2020-08-18

## 2020-05-27 RX ORDER — NORTRIPTYLINE HYDROCHLORIDE 50 MG/1
100 CAPSULE ORAL NIGHTLY
Qty: 180 CAPSULE | Refills: 0 | Status: SHIPPED | OUTPATIENT
Start: 2020-05-27 | End: 2020-08-18

## 2020-06-03 ENCOUNTER — TELEPHONE (OUTPATIENT)
Dept: PULMONOLOGY | Age: 40
End: 2020-06-03

## 2020-06-08 ENCOUNTER — APPOINTMENT (OUTPATIENT)
Dept: PULMONOLOGY | Age: 40
End: 2020-06-08

## 2020-08-18 RX ORDER — NORTRIPTYLINE HYDROCHLORIDE 50 MG/1
CAPSULE ORAL
Qty: 60 CAPSULE | Refills: 1 | Status: SHIPPED | OUTPATIENT
Start: 2020-08-18

## 2020-08-18 RX ORDER — LISINOPRIL 10 MG/1
TABLET ORAL
Qty: 30 TABLET | Refills: 1 | Status: SHIPPED | OUTPATIENT
Start: 2020-08-18

## 2021-01-11 ENCOUNTER — OFFICE VISIT (OUTPATIENT)
Dept: FAMILY MEDICINE | Facility: CLINIC | Age: 41
End: 2021-01-11
Payer: COMMERCIAL

## 2021-01-11 VITALS
TEMPERATURE: 97.8 F | DIASTOLIC BLOOD PRESSURE: 88 MMHG | OXYGEN SATURATION: 98 % | BODY MASS INDEX: 32.51 KG/M2 | HEART RATE: 101 BPM | HEIGHT: 72 IN | WEIGHT: 240.03 LBS | SYSTOLIC BLOOD PRESSURE: 132 MMHG

## 2021-01-11 DIAGNOSIS — F51.04 CHRONIC INSOMNIA: ICD-10-CM

## 2021-01-11 DIAGNOSIS — Z11.1 SCREENING EXAMINATION FOR PULMONARY TUBERCULOSIS: ICD-10-CM

## 2021-01-11 DIAGNOSIS — Z00.00 ROUTINE GENERAL MEDICAL EXAMINATION AT A HEALTH CARE FACILITY: Primary | ICD-10-CM

## 2021-01-11 DIAGNOSIS — I10 ESSENTIAL HYPERTENSION: ICD-10-CM

## 2021-01-11 DIAGNOSIS — Z11.3 SCREEN FOR STD (SEXUALLY TRANSMITTED DISEASE): ICD-10-CM

## 2021-01-11 LAB
ALBUMIN SERPL-MCNC: 4.1 G/DL (ref 3.4–5)
ALBUMIN UR-MCNC: NEGATIVE MG/DL
ALP SERPL-CCNC: 78 U/L (ref 40–150)
ALT SERPL W P-5'-P-CCNC: 78 U/L (ref 0–70)
ANION GAP SERPL CALCULATED.3IONS-SCNC: 6 MMOL/L (ref 3–14)
APPEARANCE UR: CLEAR
AST SERPL W P-5'-P-CCNC: 33 U/L (ref 0–45)
BILIRUB SERPL-MCNC: 0.5 MG/DL (ref 0.2–1.3)
BILIRUB UR QL STRIP: NEGATIVE
BUN SERPL-MCNC: 16 MG/DL (ref 7–30)
CALCIUM SERPL-MCNC: 9.2 MG/DL (ref 8.5–10.1)
CHLORIDE SERPL-SCNC: 107 MMOL/L (ref 94–109)
CHOLEST SERPL-MCNC: 288 MG/DL
CO2 SERPL-SCNC: 26 MMOL/L (ref 20–32)
COLOR UR AUTO: YELLOW
CREAT SERPL-MCNC: 1.02 MG/DL (ref 0.66–1.25)
ERYTHROCYTE [DISTWIDTH] IN BLOOD BY AUTOMATED COUNT: 14.5 % (ref 10–15)
GFR SERPL CREATININE-BSD FRML MDRD: >90 ML/MIN/{1.73_M2}
GLUCOSE SERPL-MCNC: 96 MG/DL (ref 70–99)
GLUCOSE UR STRIP-MCNC: NEGATIVE MG/DL
HCT VFR BLD AUTO: 43.9 % (ref 40–53)
HCV AB SERPL QL IA: NONREACTIVE
HDLC SERPL-MCNC: 48 MG/DL
HGB BLD-MCNC: 15.2 G/DL (ref 13.3–17.7)
HGB UR QL STRIP: NEGATIVE
HIV 1+2 AB+HIV1 P24 AG SERPL QL IA: NONREACTIVE
KETONES UR STRIP-MCNC: NEGATIVE MG/DL
LDLC SERPL CALC-MCNC: 218 MG/DL
LEUKOCYTE ESTERASE UR QL STRIP: NEGATIVE
MCH RBC QN AUTO: 28.1 PG (ref 26.5–33)
MCHC RBC AUTO-ENTMCNC: 34.6 G/DL (ref 31.5–36.5)
MCV RBC AUTO: 81 FL (ref 78–100)
NITRATE UR QL: NEGATIVE
NONHDLC SERPL-MCNC: 240 MG/DL
PH UR STRIP: 5 PH (ref 5–7)
PLATELET # BLD AUTO: 233 10E9/L (ref 150–450)
POTASSIUM SERPL-SCNC: 4 MMOL/L (ref 3.4–5.3)
PROT SERPL-MCNC: 7.8 G/DL (ref 6.8–8.8)
RBC # BLD AUTO: 5.41 10E12/L (ref 4.4–5.9)
SODIUM SERPL-SCNC: 139 MMOL/L (ref 133–144)
SOURCE: NORMAL
SP GR UR STRIP: >1.03 (ref 1–1.03)
T PALLIDUM AB SER QL: NONREACTIVE
TRIGL SERPL-MCNC: 109 MG/DL
UROBILINOGEN UR STRIP-ACNC: 0.2 EU/DL (ref 0.2–1)
WBC # BLD AUTO: 5.6 10E9/L (ref 4–11)

## 2021-01-11 PROCEDURE — 80053 COMPREHEN METABOLIC PANEL: CPT | Performed by: FAMILY MEDICINE

## 2021-01-11 PROCEDURE — 86780 TREPONEMA PALLIDUM: CPT | Mod: 90 | Performed by: FAMILY MEDICINE

## 2021-01-11 PROCEDURE — 80061 LIPID PANEL: CPT | Performed by: FAMILY MEDICINE

## 2021-01-11 PROCEDURE — 36415 COLL VENOUS BLD VENIPUNCTURE: CPT | Performed by: FAMILY MEDICINE

## 2021-01-11 PROCEDURE — 99386 PREV VISIT NEW AGE 40-64: CPT | Performed by: FAMILY MEDICINE

## 2021-01-11 PROCEDURE — 86481 TB AG RESPONSE T-CELL SUSP: CPT | Performed by: FAMILY MEDICINE

## 2021-01-11 PROCEDURE — 99000 SPECIMEN HANDLING OFFICE-LAB: CPT | Performed by: FAMILY MEDICINE

## 2021-01-11 PROCEDURE — 85027 COMPLETE CBC AUTOMATED: CPT | Performed by: FAMILY MEDICINE

## 2021-01-11 PROCEDURE — 81003 URINALYSIS AUTO W/O SCOPE: CPT | Performed by: FAMILY MEDICINE

## 2021-01-11 PROCEDURE — 86803 HEPATITIS C AB TEST: CPT | Performed by: FAMILY MEDICINE

## 2021-01-11 PROCEDURE — 87389 HIV-1 AG W/HIV-1&-2 AB AG IA: CPT | Performed by: FAMILY MEDICINE

## 2021-01-11 RX ORDER — DOXEPIN HYDROCHLORIDE 10 MG/1
10 CAPSULE ORAL AT BEDTIME
Qty: 30 CAPSULE | Refills: 1 | Status: SHIPPED | OUTPATIENT
Start: 2021-01-11 | End: 2021-02-04 | Stop reason: SINTOL

## 2021-01-11 RX ORDER — LISINOPRIL 10 MG/1
10 TABLET ORAL DAILY
COMMUNITY
Start: 2020-08-26 | End: 2022-01-06

## 2021-01-11 RX ORDER — NORTRIPTYLINE HYDROCHLORIDE 50 MG/1
150 CAPSULE ORAL AT BEDTIME
COMMUNITY
Start: 2020-08-26 | End: 2021-03-18

## 2021-01-11 SDOH — HEALTH STABILITY: MENTAL HEALTH: HOW OFTEN DO YOU HAVE A DRINK CONTAINING ALCOHOL?: 2-4 TIMES A MONTH

## 2021-01-11 SDOH — ECONOMIC STABILITY: TRANSPORTATION INSECURITY
IN THE PAST 12 MONTHS, HAS THE LACK OF TRANSPORTATION KEPT YOU FROM MEDICAL APPOINTMENTS OR FROM GETTING MEDICATIONS?: NOT ASKED

## 2021-01-11 SDOH — HEALTH STABILITY: MENTAL HEALTH: HOW OFTEN DO YOU HAVE 6 OR MORE DRINKS ON ONE OCCASION?: NOT ASKED

## 2021-01-11 SDOH — HEALTH STABILITY: MENTAL HEALTH: HOW MANY STANDARD DRINKS CONTAINING ALCOHOL DO YOU HAVE ON A TYPICAL DAY?: 1 OR 2

## 2021-01-11 SDOH — ECONOMIC STABILITY: TRANSPORTATION INSECURITY
IN THE PAST 12 MONTHS, HAS LACK OF TRANSPORTATION KEPT YOU FROM MEETINGS, WORK, OR FROM GETTING THINGS NEEDED FOR DAILY LIVING?: NOT ASKED

## 2021-01-11 SDOH — ECONOMIC STABILITY: FOOD INSECURITY: WITHIN THE PAST 12 MONTHS, YOU WORRIED THAT YOUR FOOD WOULD RUN OUT BEFORE YOU GOT MONEY TO BUY MORE.: NOT ASKED

## 2021-01-11 SDOH — ECONOMIC STABILITY: FOOD INSECURITY: WITHIN THE PAST 12 MONTHS, THE FOOD YOU BOUGHT JUST DIDN'T LAST AND YOU DIDN'T HAVE MONEY TO GET MORE.: NOT ASKED

## 2021-01-11 SDOH — ECONOMIC STABILITY: INCOME INSECURITY: HOW HARD IS IT FOR YOU TO PAY FOR THE VERY BASICS LIKE FOOD, HOUSING, MEDICAL CARE, AND HEATING?: NOT ASKED

## 2021-01-11 ASSESSMENT — MIFFLIN-ST. JEOR: SCORE: 2026.77

## 2021-01-11 NOTE — PROGRESS NOTES
SUBJECTIVE:   CC: Roger Law is an 40 year old male who presents for preventive health visit.       Patient has been advised of split billing requirements and indicates understanding: Yes  Healthy Habits:    Do you get at least three servings of calcium containing foods daily (dairy, green leafy vegetables, etc.)? yes    Amount of exercise or daily activities, outside of work: not latley    Problems taking medications regularly No    Medication side effects: Yes- takes medication that helps sleep but feels patient is gaining weight.     Have you had an eye exam in the past two years? no    Do you see a dentist twice per year? yes    Do you have sleep apnea, excessive snoring or daytime drowsiness?yes- mild sleep apnea but does not have machine      Medication concerns     New patient is here today for routine physical.  He also needs paperwork completed so that he can enter the [employer] in the fall.  There is some laboratory work required for this.  His past medical history and the remainder of the chart were updated today.  He is wondering if the nortriptyline he takes for insomnia has caused some weight gain and he is wondering if there are other options.  He has tried many different agents in the past including mirtazapine, benzodiazepines, trazodone.  He reports he has been diagnosed with mild sleep apnea but treatment with CPAP machine was not effective.  He did not bring up any other concerns today.    Today's PHQ-2 Score:   PHQ-2 ( 1999 Pfizer) 1/11/2021   Q1: Little interest or pleasure in doing things 0   Q2: Feeling down, depressed or hopeless 0   PHQ-2 Score 0       Abuse: Current or Past(Physical, Sexual or Emotional)- No  Do you feel safe in your environment? Yes        Social History     Tobacco Use     Smoking status: Former Smoker     Packs/day: 0.50     Years: 10.00     Pack years: 5.00     Quit date: 1/13/2006     Years since quitting: 15.0     Smokeless tobacco: Never Used   Substance Use  Topics     Alcohol use: Yes     Frequency: 2-4 times a month     Drinks per session: 1 or 2     If you drink alcohol do you typically have >3 drinks per day or >7 drinks per week? No                      Last PSA: No results found for: PSA    Reviewed orders with patient. Reviewed health maintenance and updated orders accordingly - Yes  Patient Active Problem List   Diagnosis     Chronic insomnia     Essential hypertension     Past Surgical History:   Procedure Laterality Date     NO HISTORY OF SURGERY         Social History     Tobacco Use     Smoking status: Former Smoker     Packs/day: 0.50     Years: 10.00     Pack years: 5.00     Quit date: 1/13/2006     Years since quitting: 15.0     Smokeless tobacco: Never Used   Substance Use Topics     Alcohol use: Yes     Frequency: 2-4 times a month     Drinks per session: 1 or 2     Family History   Problem Relation Age of Onset     Hypertension Father      Depression Sister      Depression Sister          Current Outpatient Medications   Medication Sig Dispense Refill     doxepin (SINEQUAN) 10 MG capsule Take 1 capsule (10 mg) by mouth At Bedtime 30 capsule 1     lisinopril (ZESTRIL) 10 MG tablet Take 10 mg by mouth daily       nortriptyline (PAMELOR) 50 MG capsule Take 150 mg by mouth At Bedtime       No Known Allergies    Reviewed and updated as needed this visit by clinical staff  Tobacco  Allergies  Meds  Problems  Med Hx  Surg Hx  Fam Hx  Soc Hx          Reviewed and updated as needed this visit by Provider  Tobacco  Allergies  Meds  Problems  Med Hx  Surg Hx  Fam Hx  Soc Hx             ROS:  CONSTITUTIONAL: NEGATIVE for fever, chills, change in weight  INTEGUMENTARY/SKIN: NEGATIVE for worrisome rashes, moles or lesions  EYES: NEGATIVE for vision changes or irritation  ENT: NEGATIVE for ear, mouth and throat problems  RESP: NEGATIVE for significant cough or SOB  CV: NEGATIVE for chest pain, palpitations or peripheral edema  GI: NEGATIVE for nausea,  abdominal pain, heartburn, or change in bowel habits   male: negative for dysuria, hematuria, decreased urinary stream, erectile dysfunction, urethral discharge  MUSCULOSKELETAL: NEGATIVE for significant arthralgias or myalgia  NEURO: NEGATIVE for weakness, dizziness or paresthesias  PSYCHIATRIC: NEGATIVE for changes in mood or affect    OBJECTIVE:   /88 (BP Location: Right arm, Patient Position: Sitting, Cuff Size: Adult Large)   Pulse 101   Temp 97.8  F (36.6  C) (Temporal)   Ht 1.829 m (6')   Wt 108.9 kg (240 lb 0.5 oz)   SpO2 98%   BMI 32.55 kg/m    EXAM:  GENERAL: healthy, alert and no distress  EYES: Eyes grossly normal to inspection, PERRL and conjunctivae and sclerae normal  HENT: ear canals and TM's normal, nose and mouth without ulcers or lesions  NECK: no adenopathy, no asymmetry, masses, or scars and thyroid normal to palpation  RESP: lungs clear to auscultation - no rales, rhonchi or wheezes  CV: regular rate and rhythm, normal S1 S2, no S3 or S4, no murmur, click or rub, no peripheral edema and peripheral pulses strong  ABDOMEN: soft, nontender, no hepatosplenomegaly, no masses and bowel sounds normal   (male): normal male genitalia without lesions or urethral discharge, no hernia  MS: no gross musculoskeletal defects noted, no edema  SKIN: no suspicious lesions or rashes  NEURO: Normal strength and tone, mentation intact and speech normal  PSYCH: mentation appears normal, affect normal/bright    Diagnostic Test Results:  Labs reviewed in Epic    ASSESSMENT/PLAN:   1. Routine general medical examination at a health care facility  Routine health issues appropriate for age were reviewed today.  Laboratory studies were done as noted, some for preventive and some as a requirement of the [employer] form.  2 separate forms were completed today for that.  Follow-up is recommended in 1 year for routine physical.   - CBC with platelets  - Comprehensive metabolic panel (BMP + Alb, Alk Phos, ALT,  AST, Total. Bili, TP)  - *UA reflex to Microscopic and Culture ([facility] (except [place] and [place])  - Lipid panel reflex to direct LDL Fasting    2. Chronic insomnia  He can taper down on the nortriptyline and start doxepin 10 mg at bedtime to see if this would be helpful with sleep.  I have encouraged him to reach out to me or follow-up if he is not finding this beneficial.  - doxepin (SINEQUAN) 10 MG capsule; Take 1 capsule (10 mg) by mouth At Bedtime  Dispense: 30 capsule; Refill: 1    3. Essential hypertension  Under control today.  He did not need refills on medications.  Laboratory studies will be checked as noted above.    4. Screening examination for pulmonary tuberculosis  Screening for tuberculosis was needed today so for convenience of blood test was done.  - Quantiferon TB Gold Plus    5. Screen for STD (sexually transmitted disease)  RPR was necessary for the [employer] form.  Optional screening for HIV and hepatitis C was discussed and he elected to do it today.  - Treponema Abs w Reflex to RPR and Titer  - HIV Antigen Antibody Combo  - Hepatitis C antibody    Patient has been advised of split billing requirements and indicates understanding: Yes  COUNSELING:  Reviewed preventive health counseling, as reflected in patient instructions       Regular exercise       Healthy diet/nutrition       Vision screening       Hearing screening       Immunizations    Declined: Influenza due to Concerns about side effects/safety               Consider Hep C screening for all patients one time for ages 18-79 years       HIV screeninx in teen years, 1x in adult years, and at intervals if high risk    Estimated body mass index is 32.55 kg/m  as calculated from the following:    Height as of this encounter: 1.829 m (6').    Weight as of this encounter: 108.9 kg (240 lb 0.5 oz).    Weight management plan: Discussed healthy diet and exercise guidelines    He reports that he quit smoking about 15  years ago. He has a 5.00 pack-year smoking history. He has never used smokeless tobacco.      Counseling Resources:  ATP IV Guidelines  Pooled Cohorts Equation Calculator  FRAX Risk Assessment  ICSI Preventive Guidelines  Dietary Guidelines for Americans, 2010  USDA's MyPlate  ASA Prophylaxis  Lung CA Screening    Bertrand Sanchez MD  [facility]

## 2021-01-13 LAB
GAMMA INTERFERON BACKGROUND BLD IA-ACNC: 0.06 IU/ML
M TB IFN-G CD4+ BCKGRND COR BLD-ACNC: 9.94 IU/ML
M TB TUBERC IFN-G BLD QL: NEGATIVE
MITOGEN IGNF BCKGRD COR BLD-ACNC: 0 IU/ML
MITOGEN IGNF BCKGRD COR BLD-ACNC: 0.01 IU/ML

## 2021-02-02 ENCOUNTER — TELEPHONE (OUTPATIENT)
Dept: FAMILY MEDICINE | Facility: CLINIC | Age: 41
End: 2021-02-02

## 2021-02-02 NOTE — TELEPHONE ENCOUNTER
Reason for call:  Other   Patient called regarding (reason for call): call back  Additional comments: Patient is calling today because he is concerned that the Doxepin is not working. Patient would like to discuss other options.    Phone number to reach patient:  Cell number on file:    Telephone Information:   Mobile [phone]       Best Time:  Any time    Can we leave a detailed message on this number?  YES    Travel screening: Not Applicable

## 2021-02-02 NOTE — TELEPHONE ENCOUNTER
Reached out to patient and scheduled a telephone visit with his provider to discuss the medication that is not working for him.      Radha Watson MA

## 2021-02-03 NOTE — PROGRESS NOTES
Roger is a 40 year old who is being evaluated via a billable telephone visit.      What phone number would you like to be contacted at? [phone]  How would you like to obtain your AVS? MyChart  Assessment & Plan     Chronic insomnia  Patient has chronic insomnia.  It appears to primarily be some middle and terminal insomnia with some intermittent initial insomnia.  He has been on multiple medications in the past and has also tried CBT without significant improvement over time.  He does seem to recall that Lunesta was effective but he could not really stay on it for an extended period of time due to cost.  I have suggested we try that again as it may be available as generic and cost lower at this time.  I have asked him to let me know in 1 to 2 months how things are working.  - eszopiclone (LUNESTA) 2 MG tablet; Take 1 tablet (2 mg) by mouth At Bedtime                             Return in about 2 months (around 4/4/2021) for Mood Recheck.    Bertrand Sanchez MD  [facility]    Evaristo Duran is a 40 year old who presents to clinic today for the following health issues     HPI       Medication Followup of Doxepin 10mg    Taking Medication as prescribed: yes    Side Effects:  Fast heartbeat     Medication Helping Symptoms:  NO     He just restarted the nortriptyline for the last 2 nights because the doxepin seem to make his heart beat too rapidly.  He went straight to 150 mg nightly of the nortriptyline and is slept better for the last 2 nights.  The main motivation for switching around medications was a concern that he had gained weight due to the nortriptyline.  He is also tried amitriptyline, mirtazapine, and a variety of benzodiazepines in the past.  He reports that he has done some sleep therapy/CBT in the past as well.    Depression Followup    How are you doing with your depression since your last visit? Worsened but stable     Are you having other symptoms that might  be associated with depression? No    Have you had a significant life event?  No     Are you feeling anxious or having panic attacks?   No    Do you have any concerns with your use of alcohol or other drugs? No    Social History     Tobacco Use     Smoking status: Former Smoker     Packs/day: 0.50     Years: 10.00     Pack years: 5.00     Quit date: 1/13/2006     Years since quitting: 15.0     Smokeless tobacco: Never Used   Substance Use Topics     Alcohol use: Yes     Frequency: 2-4 times a month     Drinks per session: 1 or 2     Drug use: Never     PHQ 2/4/2021   PHQ-9 Total Score 2   Q9: Thoughts of better off dead/self-harm past 2 weeks Not at all     ANDREAS-7 SCORE 2/4/2021   Total Score 0     Last PHQ-9 2/4/2021   1.  Little interest or pleasure in doing things 0   2.  Feeling down, depressed, or hopeless 0   3.  Trouble falling or staying asleep, or sleeping too much 1   4.  Feeling tired or having little energy 1   5.  Poor appetite or overeating 0   6.  Feeling bad about yourself 0   7.  Trouble concentrating 0   8.  Moving slowly or restless 0   Q9: Thoughts of better off dead/self-harm past 2 weeks 0   PHQ-9 Total Score 2   Difficulty at work, home, or with people Not difficult at all     ANDREAS-7  2/4/2021   1. Feeling nervous, anxious, or on edge 0   2. Not being able to stop or control worrying 0   3. Worrying too much about different things 0   4. Trouble relaxing 0   5. Being so restless that it is hard to sit still 0   6. Becoming easily annoyed or irritable 0   7. Feeling afraid, as if something awful might happen 0   ANDREAS-7 Total Score 0   If you checked any problems, how difficult have they made it for you to do your work, take care of things at home, or get along with other people? Not difficult at all         Suicide Assessment Five-step Evaluation and Treatment (SAFE-T)      How many servings of fruits and vegetables do you eat daily?  0-1    On average, how many sweetened beverages do you drink  each day (Examples: soda, juice, sweet tea, etc.  Do NOT count diet or artificially sweetened beverages)?   0    How many days per week do you exercise enough to make your heart beat faster? 3 or less    How many minutes a day do you exercise enough to make your heart beat faster? 9 or less    How many days per week do you miss taking your medication? 0      Review of Systems         Objective           Vitals:  No vitals were obtained today due to virtual visit.    Physical Exam   healthy, alert and no distress  PSYCH: Alert and oriented times 3; coherent speech, normal   rate and volume, able to articulate logical thoughts, able   to abstract reason, no tangential thoughts, no hallucinations   or delusions  His affect is normal  RESP: No cough, no audible wheezing, able to talk in full sentences  Remainder of exam unable to be completed due to telephone visits                Phone call duration: 11 minutes

## 2021-02-04 ENCOUNTER — VIRTUAL VISIT (OUTPATIENT)
Dept: FAMILY MEDICINE | Facility: CLINIC | Age: 41
End: 2021-02-04
Payer: COMMERCIAL

## 2021-02-04 DIAGNOSIS — F51.04 CHRONIC INSOMNIA: Primary | ICD-10-CM

## 2021-02-04 PROCEDURE — 99213 OFFICE O/P EST LOW 20 MIN: CPT | Mod: TEL | Performed by: FAMILY MEDICINE

## 2021-02-04 PROCEDURE — 96127 BRIEF EMOTIONAL/BEHAV ASSMT: CPT | Performed by: FAMILY MEDICINE

## 2021-02-04 RX ORDER — ESZOPICLONE 2 MG/1
2 TABLET, FILM COATED ORAL AT BEDTIME
Qty: 30 TABLET | Refills: 1 | Status: SHIPPED | OUTPATIENT
Start: 2021-02-04 | End: 2021-03-18

## 2021-02-04 ASSESSMENT — PATIENT HEALTH QUESTIONNAIRE - PHQ9
5. POOR APPETITE OR OVEREATING: NOT AT ALL
SUM OF ALL RESPONSES TO PHQ QUESTIONS 1-9: 2

## 2021-02-04 ASSESSMENT — ANXIETY QUESTIONNAIRES
1. FEELING NERVOUS, ANXIOUS, OR ON EDGE: NOT AT ALL
3. WORRYING TOO MUCH ABOUT DIFFERENT THINGS: NOT AT ALL
7. FEELING AFRAID AS IF SOMETHING AWFUL MIGHT HAPPEN: NOT AT ALL
5. BEING SO RESTLESS THAT IT IS HARD TO SIT STILL: NOT AT ALL
6. BECOMING EASILY ANNOYED OR IRRITABLE: NOT AT ALL
IF YOU CHECKED OFF ANY PROBLEMS ON THIS QUESTIONNAIRE, HOW DIFFICULT HAVE THESE PROBLEMS MADE IT FOR YOU TO DO YOUR WORK, TAKE CARE OF THINGS AT HOME, OR GET ALONG WITH OTHER PEOPLE: NOT DIFFICULT AT ALL
2. NOT BEING ABLE TO STOP OR CONTROL WORRYING: NOT AT ALL
GAD7 TOTAL SCORE: 0

## 2021-02-05 ASSESSMENT — ANXIETY QUESTIONNAIRES: GAD7 TOTAL SCORE: 0

## 2021-03-07 ENCOUNTER — HEALTH MAINTENANCE LETTER (OUTPATIENT)
Age: 41
End: 2021-03-07

## 2021-03-17 NOTE — PROGRESS NOTES
Roger is a 40 year old who is being evaluated via a billable telephone visit.      What phone number would you like to be contacted at? [phone]   How would you like to obtain your AVS? MyChart    Assessment & Plan     Chronic insomnia  The patient was open to trying a higher dose of the Lunesta.  When he went on the 2 mg dose he had initially tried this every night but had to add back in some nortriptyline because the Lunesta seemed to lose its effectiveness over time.  I did send a prescription to the pharmacy for the higher dose of Lunesta.  I also refilled the nortriptyline today.  I have asked for recheck in 1 to 2 months depending on how things are going.  - eszopiclone (LUNESTA) 3 MG tablet; Take 1 tablet (3 mg) by mouth At Bedtime  - nortriptyline (PAMELOR) 50 MG capsule; Take 3 capsules (150 mg) by mouth At Bedtime    Essential hypertension  Patient reports blood pressures are under control at this time.                   Return in about 2 months (around 5/18/2021) for Routine Visit.    Bertrand Sanchez MD  [facility]   Roger is a 40 year old who presents for the following health issues     HPI   Pt has not been able to discontinue his Nortriptyline as he is continuing to have some sleep concerns and pt taking Lunesta prn  Hypertension Follow-up      Do you check your blood pressure regularly outside of the clinic? Yes     Are you following a low salt diet? No    Are your blood pressures ever more than 140 on the top number (systolic) OR more   than 90 on the bottom number (diastolic), for example 140/90? No Latest 128/81      How many servings of fruits and vegetables do you eat daily?  2-3    On average, how many sweetened beverages do you drink each day (Examples: soda, juice, sweet tea, etc.  Do NOT count diet or artificially sweetened beverages)?   0    How many days per week do you exercise enough to make your heart beat faster? 4    How many  minutes a day do you exercise enough to make your heart beat faster? 10 - 19    How many days per week do you miss taking your medication? 0    With regards to the insomnia he usually goes to bed at about 10:45-11 pm nightly and gets up around 5-6 AM.  Initially he tried the Lunesta on a nightly basis but it seemed to lose its effectiveness and that may have been a past pattern for him.  Subsequently he is added in the nortriptyline and right now he tends to take that more regularly and the Lunesta as needed if the nortriptyline does not make him feel tired enough.  He would still like to try to get off the nortriptyline because of concerns around weight gain.    Review of Systems   Constitutional, HEENT, cardiovascular, pulmonary, gi and gu systems are negative, except as otherwise noted.      Objective           Vitals:  No vitals were obtained today due to virtual visit.    Physical Exam   healthy, alert and no distress  PSYCH: Alert and oriented times 3; coherent speech, normal   rate and volume, able to articulate logical thoughts, able   to abstract reason, no tangential thoughts, no hallucinations   or delusions  His affect is normal  RESP: No cough, no audible wheezing, able to talk in full sentences  Remainder of exam unable to be completed due to telephone visits                Phone call duration: 9 minutes

## 2021-03-18 ENCOUNTER — VIRTUAL VISIT (OUTPATIENT)
Dept: FAMILY MEDICINE | Facility: CLINIC | Age: 41
End: 2021-03-18
Payer: COMMERCIAL

## 2021-03-18 DIAGNOSIS — F51.04 CHRONIC INSOMNIA: Primary | ICD-10-CM

## 2021-03-18 DIAGNOSIS — I10 ESSENTIAL HYPERTENSION: ICD-10-CM

## 2021-03-18 PROCEDURE — 99213 OFFICE O/P EST LOW 20 MIN: CPT | Mod: TEL | Performed by: FAMILY MEDICINE

## 2021-03-18 RX ORDER — ESZOPICLONE 3 MG/1
3 TABLET, FILM COATED ORAL AT BEDTIME
Qty: 30 TABLET | Refills: 1 | Status: SHIPPED | OUTPATIENT
Start: 2021-03-18 | End: 2021-05-24

## 2021-03-18 RX ORDER — NORTRIPTYLINE HYDROCHLORIDE 50 MG/1
150 CAPSULE ORAL AT BEDTIME
Qty: 90 CAPSULE | Refills: 1 | Status: SHIPPED | OUTPATIENT
Start: 2021-03-18 | End: 2021-07-26

## 2021-04-05 ENCOUNTER — VIRTUAL VISIT (OUTPATIENT)
Dept: FAMILY MEDICINE | Facility: CLINIC | Age: 41
End: 2021-04-05
Payer: COMMERCIAL

## 2021-04-05 DIAGNOSIS — R05.9 COUGH: Primary | ICD-10-CM

## 2021-04-05 PROCEDURE — 99213 OFFICE O/P EST LOW 20 MIN: CPT | Mod: 95 | Performed by: NURSE PRACTITIONER

## 2021-04-05 RX ORDER — BENZONATATE 200 MG/1
200 CAPSULE ORAL 3 TIMES DAILY PRN
Qty: 30 CAPSULE | Refills: 1 | Status: SHIPPED | OUTPATIENT
Start: 2021-04-05 | End: 2022-03-01

## 2021-04-05 NOTE — LETTER
April 5, 2021      Roger Law  [address] [address]        To Whom It May Concern:    Roger Law  was seen on 4/5/21.  Please excuse him until 4/12/2021 due to illness. Please contact our office with questions or concerns.      Sincerely,        JUJU Hennessy CNP

## 2021-04-05 NOTE — PROGRESS NOTES
Roger is a 40 year old who is being evaluated via a billable video visit.      How would you like to obtain your AVS? MyChart  If the video visit is dropped, the invitation should be resent by: Send to e-mail at: [email or web address]  Will anyone else be joining your video visit? No    Video Start Time: 11:10 am    Assessment & Plan   Problem List Items Addressed This Visit     None      Visit Diagnoses     Cough    -  Primary    Relevant Medications    benzonatate (TESSALON) 200 MG capsule       recommended supportive care; advised follow up by the end of the week if not improving; consider urgent care for chest xray if no improvement  -He had covid test on day 2 of symptoms which was negative; recommended he may order another covid test to be sent to his home through [facility] if he would like to re-test. Recommended continuing to self-isolate until at least 48 hours after symptoms resolve.   25 minutes spent on the date of the encounter doing chart review, history and exam, documentation and further activities per the note       See Patient Instructions    No follow-ups on file.    JUJU Hennessy CNP  [facility]    Evaristo Duran is a 40 year old who presents for the following health issues   HPI     Acute Illness  Acute illness concerns: Fatigue, cough, congestion, headaches  Onset/Duration: 3/29/2021  Symptoms:  Fever: YES  Chills/Sweats: YES- Chills  Headache (location?): YES- all around head  Sinus Pressure: YES  Conjunctivitis:  no  Ear Pain: no  Rhinorrhea: no  Congestion: YES  Sore Throat: no  Cough: YES-productive of clear sputum  Wheeze: YES  Decreased Appetite: YES  Nausea: YES- if tries to eat  Vomiting: no  Diarrhea: no  Dysuria/Freq.: no  Dysuria or Hematuria: no  Fatigue/Achiness: YES  Sick/Strep Exposure: no  Therapies tried and outcome: Dayquil with little relief  Had a covid test on the second day that he was ill, which was negative. He continues to feel  very fatigued with low appetite. No taste or smell disturbances. No severe sinus pressure or pain. Had a high fever the first two days he was ill, continues to feel somewhat feverish now, but slightly better. He works from home; was not exposed to anyone ill.     Review of Systems   Constitutional, HEENT, cardiovascular, pulmonary, gi and gu systems are negative, except as otherwise noted.      Objective           Vitals:  No vitals were obtained today due to virtual visit.    Physical Exam   GENERAL: Healthy, alert and no distress  EYES: Eyes grossly normal to inspection.  No discharge or erythema, or obvious scleral/conjunctival abnormalities.  RESP: + dry cough  SKIN: Visible skin clear. No significant rash, abnormal pigmentation or lesions.  NEURO: Cranial nerves grossly intact.  Mentation and speech appropriate for age.  PSYCH: Mentation appears normal, affect normal/bright, judgement and insight intact, normal speech and appearance well-groomed.          Video-Visit Details    Type of service:  Video Visit    Video End Time:11:27 AM    Originating Location (pt. Location): Home    Distant Location (provider location):  [facility]     Platform used for Video Visit: [employer]

## 2021-04-10 ENCOUNTER — NURSE TRIAGE (OUTPATIENT)
Dept: NURSING | Facility: CLINIC | Age: 41
End: 2021-04-10

## 2021-04-10 ENCOUNTER — APPOINTMENT (OUTPATIENT)
Dept: GENERAL RADIOLOGY | Facility: CLINIC | Age: 41
DRG: 177 | End: 2021-04-10
Attending: FAMILY MEDICINE
Payer: COMMERCIAL

## 2021-04-10 ENCOUNTER — HOSPITAL ENCOUNTER (INPATIENT)
Facility: CLINIC | Age: 41
LOS: 3 days | Discharge: HOME | DRG: 177 | End: 2021-04-13
Attending: FAMILY MEDICINE | Admitting: INTERNAL MEDICINE
Payer: COMMERCIAL

## 2021-04-10 ENCOUNTER — VIRTUAL VISIT (OUTPATIENT)
Dept: URGENT CARE | Facility: CLINIC | Age: 41
End: 2021-04-10
Payer: COMMERCIAL

## 2021-04-10 DIAGNOSIS — U07.1 PNEUMONIA DUE TO CORONAVIRUS DISEASE 2019: ICD-10-CM

## 2021-04-10 DIAGNOSIS — U07.1 PNEUMONIA DUE TO 2019 NOVEL CORONAVIRUS: ICD-10-CM

## 2021-04-10 DIAGNOSIS — Z87.891 PERSONAL HISTORY OF TOBACCO USE, PRESENTING HAZARDS TO HEALTH: ICD-10-CM

## 2021-04-10 DIAGNOSIS — J12.82 PNEUMONIA DUE TO 2019 NOVEL CORONAVIRUS: ICD-10-CM

## 2021-04-10 DIAGNOSIS — R06.02 SHORTNESS OF BREATH: Primary | ICD-10-CM

## 2021-04-10 DIAGNOSIS — J12.82 PNEUMONIA DUE TO CORONAVIRUS DISEASE 2019: ICD-10-CM

## 2021-04-10 DIAGNOSIS — Z20.822 SUSPECTED COVID-19 VIRUS INFECTION: ICD-10-CM

## 2021-04-10 DIAGNOSIS — U07.1 LAB TEST POSITIVE FOR DETECTION OF COVID-19 VIRUS: ICD-10-CM

## 2021-04-10 LAB
ALBUMIN SERPL-MCNC: 2.9 G/DL (ref 3.4–5)
ALP SERPL-CCNC: 144 U/L (ref 40–150)
ALT SERPL W P-5'-P-CCNC: 136 U/L (ref 0–70)
ANION GAP SERPL CALCULATED.3IONS-SCNC: 9 MMOL/L (ref 3–14)
ANISOCYTOSIS BLD QL SMEAR: SLIGHT
AST SERPL W P-5'-P-CCNC: 108 U/L (ref 0–45)
BASOPHILS # BLD AUTO: 0 10E9/L (ref 0–0.2)
BASOPHILS NFR BLD AUTO: 0 %
BILIRUB SERPL-MCNC: 0.8 MG/DL (ref 0.2–1.3)
BUN SERPL-MCNC: 10 MG/DL (ref 7–30)
CALCIUM SERPL-MCNC: 8.1 MG/DL (ref 8.5–10.1)
CHLORIDE SERPL-SCNC: 98 MMOL/L (ref 94–109)
CK SERPL-CCNC: 240 U/L (ref 30–300)
CO2 SERPL-SCNC: 29 MMOL/L (ref 20–32)
CREAT SERPL-MCNC: 0.78 MG/DL (ref 0.66–1.25)
CRP SERPL-MCNC: 100 MG/L (ref 0–8)
D DIMER PPP FEU-MCNC: 1.4 UG/ML FEU (ref 0–0.5)
D DIMER PPP FEU-MCNC: 1.5 UG/ML FEU (ref 0–0.5)
DIFFERENTIAL METHOD BLD: NORMAL
EOSINOPHIL # BLD AUTO: 0 10E9/L (ref 0–0.7)
EOSINOPHIL NFR BLD AUTO: 0 %
ERYTHROCYTE [DISTWIDTH] IN BLOOD BY AUTOMATED COUNT: 14.1 % (ref 10–15)
GFR SERPL CREATININE-BSD FRML MDRD: >90 ML/MIN/{1.73_M2}
GLUCOSE SERPL-MCNC: 104 MG/DL (ref 70–99)
HCT VFR BLD AUTO: 43 % (ref 40–53)
HGB BLD-MCNC: 14.4 G/DL (ref 13.3–17.7)
LYMPHOCYTES # BLD AUTO: 0.9 10E9/L (ref 0.8–5.3)
LYMPHOCYTES NFR BLD AUTO: 19.1 %
MCH RBC QN AUTO: 27.3 PG (ref 26.5–33)
MCHC RBC AUTO-ENTMCNC: 33.5 G/DL (ref 31.5–36.5)
MCV RBC AUTO: 82 FL (ref 78–100)
MONOCYTES # BLD AUTO: 0.4 10E9/L (ref 0–1.3)
MONOCYTES NFR BLD AUTO: 7.8 %
NEUTROPHILS # BLD AUTO: 3.5 10E9/L (ref 1.6–8.3)
NEUTROPHILS NFR BLD AUTO: 73.1 %
PLATELET # BLD AUTO: 216 10E9/L (ref 150–450)
PLATELET # BLD EST: NORMAL 10*3/UL
POIKILOCYTOSIS BLD QL SMEAR: SLIGHT
POTASSIUM SERPL-SCNC: 3.8 MMOL/L (ref 3.4–5.3)
PROT SERPL-MCNC: 7 G/DL (ref 6.8–8.8)
RBC # BLD AUTO: 5.27 10E12/L (ref 4.4–5.9)
SODIUM SERPL-SCNC: 136 MMOL/L (ref 133–144)
WBC # BLD AUTO: 4.8 10E9/L (ref 4–11)

## 2021-04-10 PROCEDURE — 96361 HYDRATE IV INFUSION ADD-ON: CPT | Performed by: FAMILY MEDICINE

## 2021-04-10 PROCEDURE — 82550 ASSAY OF CK (CPK): CPT | Performed by: FAMILY MEDICINE

## 2021-04-10 PROCEDURE — 250N000011 HC RX IP 250 OP 636: Performed by: FAMILY MEDICINE

## 2021-04-10 PROCEDURE — 250N000011 HC RX IP 250 OP 636: Performed by: INTERNAL MEDICINE

## 2021-04-10 PROCEDURE — 86140 C-REACTIVE PROTEIN: CPT | Performed by: FAMILY MEDICINE

## 2021-04-10 PROCEDURE — 86140 C-REACTIVE PROTEIN: CPT | Performed by: INTERNAL MEDICINE

## 2021-04-10 PROCEDURE — 99223 1ST HOSP IP/OBS HIGH 75: CPT | Mod: AI | Performed by: INTERNAL MEDICINE

## 2021-04-10 PROCEDURE — 80053 COMPREHEN METABOLIC PANEL: CPT | Performed by: FAMILY MEDICINE

## 2021-04-10 PROCEDURE — 250N000009 HC RX 250: Performed by: INTERNAL MEDICINE

## 2021-04-10 PROCEDURE — 99207 PR NO CHARGE LOS: CPT

## 2021-04-10 PROCEDURE — 85379 FIBRIN DEGRADATION QUANT: CPT | Performed by: FAMILY MEDICINE

## 2021-04-10 PROCEDURE — 96375 TX/PRO/DX INJ NEW DRUG ADDON: CPT | Performed by: FAMILY MEDICINE

## 2021-04-10 PROCEDURE — 99285 EMERGENCY DEPT VISIT HI MDM: CPT | Performed by: FAMILY MEDICINE

## 2021-04-10 PROCEDURE — XW033E5 INTRODUCTION OF REMDESIVIR ANTI-INFECTIVE INTO PERIPHERAL VEIN, PERCUTANEOUS APPROACH, NEW TECHNOLOGY GROUP 5: ICD-10-PCS | Performed by: INTERNAL MEDICINE

## 2021-04-10 PROCEDURE — 99285 EMERGENCY DEPT VISIT HI MDM: CPT | Mod: 25 | Performed by: FAMILY MEDICINE

## 2021-04-10 PROCEDURE — 85025 COMPLETE CBC W/AUTO DIFF WBC: CPT | Performed by: FAMILY MEDICINE

## 2021-04-10 PROCEDURE — 36415 COLL VENOUS BLD VENIPUNCTURE: CPT | Performed by: INTERNAL MEDICINE

## 2021-04-10 PROCEDURE — 258N000003 HC RX IP 258 OP 636: Performed by: INTERNAL MEDICINE

## 2021-04-10 PROCEDURE — 250N000013 HC RX MED GY IP 250 OP 250 PS 637: Performed by: INTERNAL MEDICINE

## 2021-04-10 PROCEDURE — 85379 FIBRIN DEGRADATION QUANT: CPT | Performed by: INTERNAL MEDICINE

## 2021-04-10 PROCEDURE — 120N000002 HC R&B MED SURG/OB UMMC

## 2021-04-10 PROCEDURE — 71045 X-RAY EXAM CHEST 1 VIEW: CPT

## 2021-04-10 PROCEDURE — 96365 THER/PROPH/DIAG IV INF INIT: CPT | Performed by: FAMILY MEDICINE

## 2021-04-10 PROCEDURE — 258N000003 HC RX IP 258 OP 636

## 2021-04-10 RX ORDER — ESZOPICLONE 3 MG/1
3 TABLET, FILM COATED ORAL AT BEDTIME
Status: DISCONTINUED | OUTPATIENT
Start: 2021-04-10 | End: 2021-04-13 | Stop reason: HOSPADM

## 2021-04-10 RX ORDER — SODIUM CHLORIDE 9 MG/ML
INJECTION, SOLUTION INTRAVENOUS CONTINUOUS
Status: DISCONTINUED | OUTPATIENT
Start: 2021-04-10 | End: 2021-04-11

## 2021-04-10 RX ORDER — BENZONATATE 100 MG/1
200 CAPSULE ORAL 3 TIMES DAILY PRN
Status: DISCONTINUED | OUTPATIENT
Start: 2021-04-10 | End: 2021-04-13 | Stop reason: HOSPADM

## 2021-04-10 RX ORDER — DEXAMETHASONE SODIUM PHOSPHATE 4 MG/ML
6 INJECTION, SOLUTION INTRA-ARTICULAR; INTRALESIONAL; INTRAMUSCULAR; INTRAVENOUS; SOFT TISSUE ONCE
Status: COMPLETED | OUTPATIENT
Start: 2021-04-10 | End: 2021-04-10

## 2021-04-10 RX ORDER — ONDANSETRON 2 MG/ML
4 INJECTION INTRAMUSCULAR; INTRAVENOUS EVERY 6 HOURS PRN
Status: DISCONTINUED | OUTPATIENT
Start: 2021-04-10 | End: 2021-04-13 | Stop reason: HOSPADM

## 2021-04-10 RX ORDER — ACETAMINOPHEN 650 MG/1
650 SUPPOSITORY RECTAL EVERY 4 HOURS PRN
Status: DISCONTINUED | OUTPATIENT
Start: 2021-04-10 | End: 2021-04-13 | Stop reason: HOSPADM

## 2021-04-10 RX ORDER — ONDANSETRON 4 MG/1
4 TABLET, ORALLY DISINTEGRATING ORAL EVERY 6 HOURS PRN
Status: DISCONTINUED | OUTPATIENT
Start: 2021-04-10 | End: 2021-04-13 | Stop reason: HOSPADM

## 2021-04-10 RX ORDER — CHLORAL HYDRATE 500 MG
2 CAPSULE ORAL DAILY
COMMUNITY

## 2021-04-10 RX ORDER — METHYLPREDNISOLONE SODIUM SUCCINATE 125 MG/2ML
125 INJECTION, POWDER, LYOPHILIZED, FOR SOLUTION INTRAMUSCULAR; INTRAVENOUS ONCE
Status: DISCONTINUED | OUTPATIENT
Start: 2021-04-10 | End: 2021-04-10

## 2021-04-10 RX ORDER — LISINOPRIL 10 MG/1
10 TABLET ORAL DAILY
Status: DISCONTINUED | OUTPATIENT
Start: 2021-04-11 | End: 2021-04-13 | Stop reason: HOSPADM

## 2021-04-10 RX ORDER — PHENOL 1.4 %
20 AEROSOL, SPRAY (ML) MUCOUS MEMBRANE
COMMUNITY
End: 2022-03-01

## 2021-04-10 RX ORDER — SODIUM CHLORIDE 9 MG/ML
INJECTION, SOLUTION INTRAVENOUS
Status: COMPLETED
Start: 2021-04-10 | End: 2021-04-10

## 2021-04-10 RX ORDER — NORTRIPTYLINE HYDROCHLORIDE 75 MG/1
150 CAPSULE ORAL AT BEDTIME
Status: DISCONTINUED | OUTPATIENT
Start: 2021-04-10 | End: 2021-04-13 | Stop reason: HOSPADM

## 2021-04-10 RX ORDER — ACETAMINOPHEN 325 MG/1
650 TABLET ORAL EVERY 4 HOURS PRN
Status: DISCONTINUED | OUTPATIENT
Start: 2021-04-10 | End: 2021-04-13 | Stop reason: HOSPADM

## 2021-04-10 RX ORDER — LIDOCAINE 40 MG/G
CREAM TOPICAL
Status: DISCONTINUED | OUTPATIENT
Start: 2021-04-10 | End: 2021-04-13 | Stop reason: HOSPADM

## 2021-04-10 RX ADMIN — DEXAMETHASONE SODIUM PHOSPHATE 6 MG: 4 INJECTION, SOLUTION INTRAMUSCULAR; INTRAVENOUS at 18:41

## 2021-04-10 RX ADMIN — ENOXAPARIN SODIUM 40 MG: 100 INJECTION SUBCUTANEOUS at 21:37

## 2021-04-10 RX ADMIN — NORTRIPTYLINE HYDROCHLORIDE 150 MG: 75 CAPSULE ORAL at 21:37

## 2021-04-10 RX ADMIN — Medication 20 MG: at 22:10

## 2021-04-10 RX ADMIN — SODIUM CHLORIDE 500 ML: 9 INJECTION, SOLUTION INTRAVENOUS at 18:00

## 2021-04-10 RX ADMIN — REMDESIVIR 200 MG: 100 INJECTION, POWDER, LYOPHILIZED, FOR SOLUTION INTRAVENOUS at 18:46

## 2021-04-10 RX ADMIN — Medication 500 ML: at 18:00

## 2021-04-10 RX ADMIN — ESZOPICLONE 3 MG: 3 TABLET, FILM COATED OROPHARYNGEAL at 21:37

## 2021-04-10 RX ADMIN — SODIUM CHLORIDE: 9 INJECTION, SOLUTION INTRAVENOUS at 21:36

## 2021-04-10 NOTE — H&P
[facility]    History and Physical - Hospitalist Service       Date of Admission:  4/10/2021    Assessment & Plan   Roger Law is a 40 year old male admitted on 4/10/2021. He has a known H/O HTN and depression. He is presenting with increasing SOB and fatigue in the setting of COVID-19 positive ( test sent on 4/5), no diagnosed with COVID Pneumonia.    Acute Hypoxic respiratory Failure  COVID-19  Pneumonia  Patient meets criteria for admission given his hypoxic respiratory failure needing supplemental Oxygen  Furthermore, other features include mildly raised LFT's  CRP at 100   D Dimer at 1.5 and   CK at 240  Given bilateral pulmonary infiltrates, he meets criteria for Remdesvir and steroids  Start 200 mg loading dose with 4 doses of 100 mg maintenance  Dexamethasone 6 mg IV followed by 6 mg oral doses for 10 days   Incentive spirometry   DVT prophylaxis with Lovenox      Diet:  Regular adult   DVT Prophylaxis: Enoxaparin (Lovenox) SQ  Curiel Catheter: not present  Code Status:   Full          Disposition Plan   Expected discharge: 2 - 3 days, recommended to prior living arrangement once not requiring supplemental Oxygen and transitioned to oral medications   Entered: Dariela Vee MD 04/10/2021, 6:50 PM     The patient's care was discussed with the Bedside Nurse and Patient.    Dariela Vee MD  [facility]  Contact information available via [facility] Paging/Directory      ______________________________________________________________________    Chief Complaint   SOB  Fatigue      History is obtained from the patient    History of Present Illness   Roger Law is a 40 year old male with a past medical history significant for hypertension who presents to the ED for evaluation of shortness of breath and a cough in the setting of being Covid positive.  Patient states that he feels as though he  started having symptoms as early as 29 March but had early testing and it was negative however he then tested positive in early April and has had consistent symptoms of body aches cough and now developing increased shortness of breath over the past 24 to 48 hours.  Patient states that he is not been eating very well has not been sleeping well and feels generally as though he has been declining over the past 2 to 3 days.  Denies any chest pain   Denies nausea, vomiting or diarrhea  At baseline, is in general good health . He is tanya  Pleasant mood   Review of Systems    The 10 point Review of Systems is negative other than noted in the HPI or here.     Past Medical History    I have reviewed this patient's medical history and updated it with pertinent information if needed.   Past Medical History:   Diagnosis Date     Closed fracture of left tibia and fibula with routine healing      Depressive disorder      Hypertension        Past Surgical History   I have reviewed this patient's surgical history and updated it with pertinent information if needed.  Past Surgical History:   Procedure Laterality Date     NO HISTORY OF SURGERY         Social History   I have reviewed this patient's social history and updated it with pertinent information if needed.  Social History     Tobacco Use     Smoking status: Former Smoker     Packs/day: 0.50     Years: 10.00     Pack years: 5.00     Quit date: 1/13/2006     Years since quitting: 15.2     Smokeless tobacco: Never Used   Substance Use Topics     Alcohol use: Yes     Frequency: 2-4 times a month     Drinks per session: 1 or 2     Drug use: Never       Family History   I have reviewed this patient's family history and updated it with pertinent information if needed.  Family History   Problem Relation Age of Onset     Hypertension Father      Depression Sister      Depression Sister        Prior to Admission Medications   Prior to Admission Medications   Prescriptions Last Dose  Informant Patient Reported? Taking?   Melatonin 10 MG TABS tablet 4/9/2021 at Unknown time Self Yes Yes   Sig: Take 20 mg by mouth nightly as needed for sleep   Vitamin D3 (CHOLECALCIFEROL) 125 MCG (5000 UT) tablet 4/9/2021 at Unknown time Self Yes Yes   Sig: Take 1 tablet by mouth daily   benzonatate (TESSALON) 200 MG capsule 4/10/2021 at Unknown time Self No Yes   Sig: Take 1 capsule (200 mg) by mouth 3 times daily as needed for cough   eszopiclone (LUNESTA) 3 MG tablet 4/9/2021 at Unknown time Self No Yes   Sig: Take 1 tablet (3 mg) by mouth At Bedtime   fish oil-omega-3 fatty acids 1000 MG capsule Past Week at Unknown time Self Yes Yes   Sig: Take 2 g by mouth daily   lisinopril (ZESTRIL) 10 MG tablet Past Week at Unknown time Self Yes Yes   Sig: Take 10 mg by mouth daily   nortriptyline (PAMELOR) 50 MG capsule 4/9/2021 at Unknown time Self No Yes   Sig: Take 3 capsules (150 mg) by mouth At Bedtime   Patient taking differently: Take 100 mg by mouth At Bedtime       Facility-Administered Medications: None     Allergies   No Known Allergies    Physical Exam   Vital Signs: Temp: 98.9  F (37.2  C) Temp src: Oral BP: 102/82 Pulse: 96   Resp: 18 SpO2: 96 % O2 Device: Nasal cannula Oxygen Delivery: 2 LPM  Weight: 0 lbs 0 oz    Constitutional: awake, alert, cooperative, no apparent distress, and appears stated age  Eyes: Lids and lashes normal, pupils equal, round and reactive to light, extra ocular muscles intact, sclera clear, conjunctiva normal  ENT: Normocephalic, without obvious abnormality, atraumatic, sinuses nontender on palpation, external ears without lesions, oral pharynx with moist mucous membranes, tonsils without erythema or exudates, gums normal and good dentition.  Respiratory: No increased work of breathing. Largely clear lungs except for bi basal crackles  Cardiovascular: Normal apical impulse, regular rate and rhythm, normal S1 and S2, no S3 or S4, and no murmur noted  GI: No scars, normal bowel  sounds, soft, non-distended, non-tender, no masses palpated, no hepatosplenomegally  Skin: no bruising or bleeding and normal skin color, texture, turgor  Musculoskeletal: no lower extremity pitting edema present  there is no redness, warmth, or swelling of the joints  Neurologic: Awake, alert, oriented to name, place and time.  Cranial nerves II-XII are grossly intact.  Motor is 5 out of 5 bilaterally.  Cerebellar finger to nose, heel to shin intact.  Sensory is intact.  Babinski down going, Romberg negative, and gait is normal.    Data   Data reviewed today: I reviewed all medications, new labs and imaging results over the last 24 hours. I personally reviewed the chest x-ray image(s) showing bilateral infiltrates.    Recent Labs   Lab 04/10/21  1602   WBC 4.8   HGB 14.4   MCV 82         POTASSIUM 3.8   CHLORIDE 98   CO2 29   BUN 10   CR 0.78   ANIONGAP 9   SIA 8.1*   *   ALBUMIN 2.9*   PROTTOTAL 7.0   BILITOTAL 0.8   ALKPHOS 144   *   *

## 2021-04-10 NOTE — TELEPHONE ENCOUNTER
Patient was able to schedule virtual visit for 2:20 pm. Message back to Dr. Leija with this information at 10:28 am.    Shasta Arroyo RN/[facility], 4/10/21, 10:28 AM.

## 2021-04-10 NOTE — ED PROVIDER NOTES
ED Provider Note  [facility]      History   No chief complaint on file.    The history is provided by the patient and medical records.     Roger Law is a 40 year old male with a past medical history significant for hypertension who presents to the ED for evaluation of shortness of breath and a cough in the setting of being Covid positive.  Patient states that he feels as though he started having symptoms as early as 29 March but had early testing and it was negative however he then tested positive in early April and has had consistent symptoms of body aches cough and now developing increased shortness of breath over the past 24 to 48 hours.  Patient states that he is not been eating very well has not been sleeping well and feels generally as though he has been declining over the past 2 to 3 days.    Past Medical History  Past Medical History:   Diagnosis Date     Closed fracture of left tibia and fibula with routine healing      Depressive disorder      Hypertension      Past Surgical History:   Procedure Laterality Date     NO HISTORY OF SURGERY       benzonatate (TESSALON) 200 MG capsule  eszopiclone (LUNESTA) 3 MG tablet  lisinopril (ZESTRIL) 10 MG tablet  nortriptyline (PAMELOR) 50 MG capsule      No Known Allergies  Family History  Family History   Problem Relation Age of Onset     Hypertension Father      Depression Sister      Depression Sister      Social History   Social History     Tobacco Use     Smoking status: Former Smoker     Packs/day: 0.50     Years: 10.00     Pack years: 5.00     Quit date: 1/13/2006     Years since quitting: 15.2     Smokeless tobacco: Never Used   Substance Use Topics     Alcohol use: Yes     Frequency: 2-4 times a month     Drinks per session: 1 or 2     Drug use: Never      Past medical history, past surgical history, medications, allergies, family history, and social history were reviewed with the patient. No additional pertinent items.        Review of Systems  A complete review of systems was performed with pertinent positives and negatives noted in the HPI, and all other systems negative.    Physical Exam   BP: 102/82  Pulse: 96  Temp: 98.9  F (37.2  C)  Resp: 18  SpO2: 93 %  Physical Exam  Constitutional:       General: He is not in acute distress.     Appearance: He is not diaphoretic.   HENT:      Head: Atraumatic.   Eyes:      General: No scleral icterus.     Pupils: Pupils are equal, round, and reactive to light.   Cardiovascular:      Heart sounds: Normal heart sounds.   Pulmonary:      Effort: No respiratory distress.      Breath sounds: Normal breath sounds. No wheezing or rhonchi.   Chest:      Chest wall: No tenderness.   Abdominal:      General: Bowel sounds are normal.      Palpations: Abdomen is soft.      Tenderness: There is no abdominal tenderness.   Musculoskeletal:         General: No tenderness.   Skin:     General: Skin is warm.      Findings: No rash.   Neurological:      General: No focal deficit present.      Mental Status: He is oriented to person, place, and time.      Motor: No weakness.      Coordination: Coordination normal.         ED Course      Procedures    Patient is initial room air sats were 92 to 93% he was placed on nasal cannula with improvement to the 96-97% range.      The medical record was reviewed and interpreted.  Current labs reviewed and interpreted.  Current images reviewed and interpreted: covid pneumonia.       Results for orders placed or performed during the hospital encounter of 04/10/21   XR Chest Port 1 View     Status: None (Preliminary result)    Narrative    CHEST ONE VIEW PORTABLE  4/10/2021 4:25 PM     HISTORY: COVID positive.    COMPARISON: None.    FINDINGS:  The lungs are hypoaerated. There are subtle patchy  infiltrates bilateral lungs throughout the lungs. No pneumothorax or  significant pleural fluid collection. No acute fracture.      Impression    IMPRESSION:  1. Hypoaeration.  2.  Patchy infiltrates bilateral lungs could represent COVID-19 or  other atypical pneumonia.   CBC with platelets differential     Status: None   Result Value Ref Range    WBC 4.8 4.0 - 11.0 10e9/L    RBC Count 5.27 4.4 - 5.9 10e12/L    Hemoglobin 14.4 13.3 - 17.7 g/dL    Hematocrit 43.0 40.0 - 53.0 %    MCV 82 78 - 100 fl    MCH 27.3 26.5 - 33.0 pg    MCHC 33.5 31.5 - 36.5 g/dL    RDW 14.1 10.0 - 15.0 %    Platelet Count 216 150 - 450 10e9/L    Diff Method Manual Differential     % Neutrophils 73.1 %    % Lymphocytes 19.1 %    % Monocytes 7.8 %    % Eosinophils 0.0 %    % Basophils 0.0 %    Absolute Neutrophil 3.5 1.6 - 8.3 10e9/L    Absolute Lymphocytes 0.9 0.8 - 5.3 10e9/L    Absolute Monocytes 0.4 0.0 - 1.3 10e9/L    Absolute Eosinophils 0.0 0.0 - 0.7 10e9/L    Absolute Basophils 0.0 0.0 - 0.2 10e9/L    Anisocytosis Slight     Poikilocytosis Slight     Platelet Estimate Confirming automated cell count    Comprehensive metabolic panel     Status: Abnormal   Result Value Ref Range    Sodium 136 133 - 144 mmol/L    Potassium 3.8 3.4 - 5.3 mmol/L    Chloride 98 94 - 109 mmol/L    Carbon Dioxide 29 20 - 32 mmol/L    Anion Gap 9 3 - 14 mmol/L    Glucose 104 (H) 70 - 99 mg/dL    Urea Nitrogen 10 7 - 30 mg/dL    Creatinine 0.78 0.66 - 1.25 mg/dL    GFR Estimate >90 >60 mL/min/[1.73_m2]    GFR Estimate If Black >90 >60 mL/min/[1.73_m2]    Calcium 8.1 (L) 8.5 - 10.1 mg/dL    Bilirubin Total 0.8 0.2 - 1.3 mg/dL    Albumin 2.9 (L) 3.4 - 5.0 g/dL    Protein Total 7.0 6.8 - 8.8 g/dL    Alkaline Phosphatase 144 40 - 150 U/L     (H) 0 - 70 U/L     (H) 0 - 45 U/L     Medications   0.9% sodium chloride BOLUS (has no administration in time range)     Followed by   sodium chloride 0.9% infusion (has no administration in time range)   sodium chloride 0.9 % infusion (has no administration in time range)   dexamethasone (DECADRON) injection 6 mg (has no administration in time range)   remdesivir 200 mg in sodium chloride  0.9 % 250 mL intermittent infusion (has no administration in time range)     Followed by   0.9% sodium chloride BOLUS (has no administration in time range)        Assessments & Plan (with Medical Decision Making)       I have reviewed the nursing notes. I have reviewed the findings, diagnosis, plan and need for follow up with the patient.    Pt. with pneumonia likely due to Covid with history of previous Covid positive diagnosis early April now having increased shortness of breath patchy bilateral infiltrates at this time patient will be admitted to the medicine service on the [facility] he received dexamethasone 6 mg here in the emergency room and hospitalist will be ordering Remdenisvir as well.    Final diagnoses:   Pneumonia due to 2019 novel coronavirus       --    [facility] EMERGENCY DEPARTMENT  4/10/2021     Ralph Mccormick MD  04/10/21 183

## 2021-04-10 NOTE — TELEPHONE ENCOUNTER
Patient is COVID-19 positive with cough, no appetite, and shortness of breath.      Patient reports he did have fever, but today no longer feels feverish; says difficulty breathing is worse.    Reviewed care advice with caller per RN triage protocol guideline. Advised he may need to be seen today; will contact on-call per guideline. Caller verbalized understanding and agrees with plan.      9:51 am, paged on-call provider, Dr. Leija via Smart Web to call FNA back.    Spoke w/ Dr. Leija who would like patient to be seen virtually.    Reason for Disposition    MILD difficulty breathing (e.g., minimal/no SOB at rest, SOB with walking, pulse <100)    Additional Information    Negative: SEVERE difficulty breathing (e.g., struggling for each breath, speaks in single words)    Negative: Difficult to awaken or acting confused (e.g., disoriented, slurred speech)    Negative: Bluish (or gray) lips or face now    Negative: Shock suspected (e.g., cold/pale/clammy skin, too weak to stand, low BP, rapid pulse)    Negative: Sounds like a life-threatening emergency to the triager    Negative: SEVERE or constant chest pain or pressure (Exception: mild central chest pain, present only when coughing)    Negative: MODERATE difficulty breathing (e.g., speaks in phrases, SOB even at rest, pulse 100-120)    Negative: [1] Headache AND [2] stiff neck (can't touch chin to chest)    Protocols used: CORONAVIRUS (COVID-19) DIAGNOSED OR [id number]-L-MS 1.3

## 2021-04-10 NOTE — PHARMACY-ADMISSION MEDICATION HISTORY
Admission Medication History Completed by Pharmacy    See [facility] Admission Navigator for allergy information, preferred outpatient pharmacy, prior to admission medications and immunization status.     Medication History Sources:     Patient    Surescripts    Changes made to PTA medication list (reason):    Added: melatonin, vitamin D3, fish oil    Deleted: None    Changed:   o Patient taking differently:  - Prescribed - Nortriptyline 50 mg at bedtime  - Taking - Nortriptyline 100 mg at bedtime    Additional Information:    Patient reports he forgets to take his lisinopril occasionally    Prior to Admission medications    Medication Sig Last Dose Taking? Auth Provider   benzonatate (TESSALON) 200 MG capsule Take 1 capsule (200 mg) by mouth 3 times daily as needed for cough 4/10/2021 at Unknown time Yes Diana Simon APRN CNP   eszopiclone (LUNESTA) 3 MG tablet Take 1 tablet (3 mg) by mouth At Bedtime 4/9/2021 at Unknown time Yes Bertrand Sanchez MD   fish oil-omega-3 fatty acids 1000 MG capsule Take 2 g by mouth daily Past Week at Unknown time Yes Reported, Patient   lisinopril (ZESTRIL) 10 MG tablet Take 10 mg by mouth daily Past Week at Unknown time Yes Reported, Patient   Melatonin 10 MG TABS tablet Take 20 mg by mouth nightly as needed for sleep 4/9/2021 at Unknown time Yes Reported, Patient   nortriptyline (PAMELOR) 50 MG capsule Take 3 capsules (150 mg) by mouth At Bedtime  Patient taking differently: Take 100 mg by mouth At Bedtime  4/9/2021 at Unknown time Yes Bertrand Sanchez MD   Vitamin D3 (CHOLECALCIFEROL) 125 MCG (5000 UT) tablet Take 1 tablet by mouth daily 4/9/2021 at Unknown time Yes Reported, Patient     Date completed: 04/10/21    Medication history completed by: Azul Charles

## 2021-04-11 LAB
ANION GAP SERPL CALCULATED.3IONS-SCNC: 5 MMOL/L (ref 3–14)
BUN SERPL-MCNC: 12 MG/DL (ref 7–30)
CALCIUM SERPL-MCNC: 8.3 MG/DL (ref 8.5–10.1)
CHLORIDE SERPL-SCNC: 105 MMOL/L (ref 94–109)
CO2 SERPL-SCNC: 29 MMOL/L (ref 20–32)
CREAT SERPL-MCNC: 0.74 MG/DL (ref 0.66–1.25)
CRP SERPL-MCNC: 74 MG/L (ref 0–8)
D DIMER PPP FEU-MCNC: 1 UG/ML FEU (ref 0–0.5)
ERYTHROCYTE [DISTWIDTH] IN BLOOD BY AUTOMATED COUNT: 13.9 % (ref 10–15)
FIBRINOGEN PPP-MCNC: 548 MG/DL (ref 200–420)
GFR SERPL CREATININE-BSD FRML MDRD: >90 ML/MIN/{1.73_M2}
GLUCOSE SERPL-MCNC: 130 MG/DL (ref 70–99)
HCT VFR BLD AUTO: 40.9 % (ref 40–53)
HGB BLD-MCNC: 14 G/DL (ref 13.3–17.7)
MCH RBC QN AUTO: 27.7 PG (ref 26.5–33)
MCHC RBC AUTO-ENTMCNC: 34.2 G/DL (ref 31.5–36.5)
MCV RBC AUTO: 81 FL (ref 78–100)
NT-PROBNP SERPL-MCNC: 134 PG/ML (ref 0–450)
PLATELET # BLD AUTO: 239 10E9/L (ref 150–450)
POTASSIUM SERPL-SCNC: 3.9 MMOL/L (ref 3.4–5.3)
RBC # BLD AUTO: 5.06 10E12/L (ref 4.4–5.9)
SODIUM SERPL-SCNC: 139 MMOL/L (ref 133–144)
TROPONIN I SERPL-MCNC: <0.015 UG/L (ref 0–0.04)
WBC # BLD AUTO: 2.1 10E9/L (ref 4–11)

## 2021-04-11 PROCEDURE — 85379 FIBRIN DEGRADATION QUANT: CPT | Performed by: INTERNAL MEDICINE

## 2021-04-11 PROCEDURE — 84484 ASSAY OF TROPONIN QUANT: CPT | Performed by: INTERNAL MEDICINE

## 2021-04-11 PROCEDURE — 80048 BASIC METABOLIC PNL TOTAL CA: CPT | Performed by: INTERNAL MEDICINE

## 2021-04-11 PROCEDURE — 250N000013 HC RX MED GY IP 250 OP 250 PS 637: Performed by: INTERNAL MEDICINE

## 2021-04-11 PROCEDURE — 85027 COMPLETE CBC AUTOMATED: CPT | Performed by: INTERNAL MEDICINE

## 2021-04-11 PROCEDURE — 250N000012 HC RX MED GY IP 250 OP 636 PS 637: Performed by: INTERNAL MEDICINE

## 2021-04-11 PROCEDURE — 258N000003 HC RX IP 258 OP 636: Performed by: INTERNAL MEDICINE

## 2021-04-11 PROCEDURE — 99222 1ST HOSP IP/OBS MODERATE 55: CPT | Performed by: INTERNAL MEDICINE

## 2021-04-11 PROCEDURE — 86140 C-REACTIVE PROTEIN: CPT | Performed by: INTERNAL MEDICINE

## 2021-04-11 PROCEDURE — 250N000009 HC RX 250: Performed by: INTERNAL MEDICINE

## 2021-04-11 PROCEDURE — 120N000002 HC R&B MED SURG/OB UMMC

## 2021-04-11 PROCEDURE — 99232 SBSQ HOSP IP/OBS MODERATE 35: CPT | Performed by: INTERNAL MEDICINE

## 2021-04-11 PROCEDURE — 83880 ASSAY OF NATRIURETIC PEPTIDE: CPT | Performed by: INTERNAL MEDICINE

## 2021-04-11 PROCEDURE — 85384 FIBRINOGEN ACTIVITY: CPT | Performed by: INTERNAL MEDICINE

## 2021-04-11 PROCEDURE — 36415 COLL VENOUS BLD VENIPUNCTURE: CPT | Performed by: INTERNAL MEDICINE

## 2021-04-11 PROCEDURE — 250N000011 HC RX IP 250 OP 636: Performed by: INTERNAL MEDICINE

## 2021-04-11 RX ADMIN — NORTRIPTYLINE HYDROCHLORIDE 150 MG: 75 CAPSULE ORAL at 21:53

## 2021-04-11 RX ADMIN — DEXAMETHASONE 6 MG: 2 TABLET ORAL at 13:00

## 2021-04-11 RX ADMIN — REMDESIVIR 100 MG: 100 INJECTION, POWDER, LYOPHILIZED, FOR SOLUTION INTRAVENOUS at 22:04

## 2021-04-11 RX ADMIN — SODIUM CHLORIDE: 9 INJECTION, SOLUTION INTRAVENOUS at 04:30

## 2021-04-11 RX ADMIN — ONDANSETRON 4 MG: 4 TABLET, ORALLY DISINTEGRATING ORAL at 22:04

## 2021-04-11 RX ADMIN — ESZOPICLONE 3 MG: 3 TABLET, FILM COATED OROPHARYNGEAL at 21:53

## 2021-04-11 RX ADMIN — SODIUM CHLORIDE 50 ML: 9 INJECTION, SOLUTION INTRAVENOUS at 23:55

## 2021-04-11 RX ADMIN — LISINOPRIL 10 MG: 10 TABLET ORAL at 08:14

## 2021-04-11 RX ADMIN — ENOXAPARIN SODIUM 40 MG: 100 INJECTION SUBCUTANEOUS at 21:10

## 2021-04-11 RX ADMIN — Medication 20 MG: at 22:04

## 2021-04-11 NOTE — PLAN OF CARE
Vitals: VSS ex low SpO2. Low 90s on RA. Education on supplemental O2 at night if needed.  Lung sounds diminished at bases. Infrequent, dry cough. Mild discomfort with coughing in chest.   Output: Voids without difficulty. Up to br with IV pole. Steady and stable.   Last BM: Unable to report. Decreased appetite from s/s covid. Hypoactive bowel sounds. Passing flatus. Ate 75% box lunch.    Activity: Up at [facility]   Skin: Intact.   Pain: Denies pain. Some discomfort from coughing.   CMS/Neuro: Intact. A&O x 4.   Dressing: None   Diet: Reg, thin   LDA: PIV LUE running maintenance fluids.    Equipment: IV pole, personal belongings, fan.    Plan/Add'l info: Able to make needs known. Call light within reach. Continue with POC.  Discharge plan: TBD

## 2021-04-11 NOTE — PLAN OF CARE
Vitals: Temp: 97.2  F (36.2  C) Temp src: Oral BP: 106/60 Pulse: 80   Resp: 18 SpO2: 95 % O2 Device: None (Room air)   Infrequent, dry cough.   Output: Voids without difficulty.  No BM overnight.    Activity: Up at [place].  Steady feet   Skin: Intact.   Pain: Denies pain. Some discomfort from coughing.   CMS/Neuro: Intact.   A&O x 4.   Dressing: None   Diet: Regular diet   LDA: PIV LUE -patent and infusing    Equipment: IV pole, personal belongings, fan.    Plan/Add'l info: Able to make needs known. Call light within reach. Continue with POC.  Has been sleeping all night.

## 2021-04-11 NOTE — PROGRESS NOTES
[facility]    Medicine Progress Note - Hospitalist Service       Date of Admission:  4/10/2021  Assessment & Plan          Roger Law is a 40 year old male admitted on 4/10/2021. He has a known H/O HTN and depression. He is presenting with increasing SOB and fatigue in the setting of COVID-19 positive ( test sent on 4/5), no diagnosed with COVID Pneumonia.     Acute Hypoxic respiratory Failure  COVID-19  Pneumonia  Patient lalo criteria for admission given his hypoxic respiratory failure needing supplemental Oxygen  Furthermore, other features include mildly raised LFT's  CRP at 100   D Dimer at 1.5 and   CK at 240 on admission  Repeat labs on HD #1 with CRP down to 74, D Dimer at 1.0 and Fibrinogen at 548  Given bilateral pulmonary infiltrates, he met criteria for Remdesvir and steroids  Start 200 mg loading dose with 4 doses of 100 mg maintenance  Dexamethasone 6 mg IV followed by 6 mg oral doses for 10 days   Overall, much improved today with not requiring Supplemental Oxygen  ID team to review treatment and decide if Remdesevir needs to be continued for the entire course   Incentive spirometry   DVT prophylaxis with Lovenox         Insomnia:   Resume home dose of Lunesta 3 mg and melatonin 20 mg     HTN:   Resume home dose of Lisinopril 10 mg    Depression:  Stable.  Resume home dose of Nortriptyline         Diet: Combination Diet Regular Diet Adult    DVT Prophylaxis: Enoxaparin (Lovenox) SQ  Curiel Catheter: not present  Code Status: Full Code           Disposition Plan   Expected discharge: 2 - 3 days, recommended to prior living arrangement once COVID severity labs are improving and patinet is feeling better .  Entered: Dariela Vee MD 04/11/2021, 10:04 AM       The patient's care was discussed with the Bedside Nurse and Patient.    Dariela Vee MD  Hospitalist Service  [facility]  Contact information available via [facility] Paging/Directory    ______________________________________________________________________    Interval History   Patient feels a little better today. In that, he is off supplemental Oxygen  Still fatigued and has SOB  Chills and fevers have reduced significantly   Eating and drinking well   Body aches improved as well     Data reviewed today: I reviewed all medications, new labs and imaging results over the last 24 hours. I personally reviewed no images or EKG's today.    Physical Exam   Vital Signs: Temp: 95.6  F (35.3  C) Temp src: Oral BP: (!) 140/86 Pulse: 90   Resp: 16 SpO2: 92 % O2 Device: None (Room air) Oxygen Delivery: 2 LPM  Weight: 230 lbs 0 oz  General Appearance: Awake, alert and not in distress  Respiratory: Bilateral Crackles, R > L   Cardiovascular: Normal heart sounds. No murmurs   GI: Soft, non tender. Normal bowel sounds  Skin: No bruising or bleeding   Other: Awake, alert and orientated X 3     Data

## 2021-04-11 NOTE — CONSULTS
GENERAL INFECTIOUS DISEASES CONSULTATION  Patient:  Roger Law   Date of birth 1980, Medical record number [id number]  Date of Visit:  04/11/2021  Date of Admission: 4/10/2021  Consult Requested by:Mary Lou Griffith*  Reason for Consult:  COVID pneumonia         Assessment and Recommendations:     Roger Law is a 40 year old male with a past medical history significant for hypertension, depression admitted on 4/10/21 for shortness of breath, cough and diagnosed with COVID19 on April 9 ( mail-in test, result via email). symptoms such as fatigue, exhaustion, headaches, no appetite, fever, chills, body aches, started on March 29th . 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 he has not recieved COVID19 vaccine. his room mate was diagnosed with COVID19 around April 5. He presented to ED with worsening shortness of breath with productive cough ( thick sputum)  over the past 2 days prior to admission.  He was on oxygen supplement 2LPM on admission.  He has some left sided chest pain when he coughs.     1. COVID19 ( diagnosed on April 9)   - symptoms started around March 29  - CXR 4/10/21- Hypoaeration. Patchy infiltrates bilateral lungs could represent COVID-19 or other atypical pneumonia.  - D dimer was elevated at 4/102/21 --> 1.0 on 4/11/21 - on lovenox 40mg SQ daily   - started on Remdesevir and dexamethasone on 4/10/21     RECOMMENDATION:  - clinically improving   - if febrile send sputum for culture   - continue Remdesevir 100 mg IV daily x 4 ( after loading dose 200 mg IV on 4/10)  and dexamethasone 6 mg po daily ( IV/PO up to 10 days)   - check troponin and BNP (ordered for you)   - trend CRP     ID will follow     Thank you for allowing us to participate in the care of this patient    Benjamin Albright MD, M.Med.Sc  Staff, Infectious Diseases   Pager : [phone]         History of Present Illness:     Roger Law is a  40 year old male with a past medical history significant for hypertension, depression admitted on 4/10/21 for shortness of breath, cough and diagnosed with COVID19 on April 9 ( mail-in test, result via email). symptoms such as fatigue, exhaustion, headaches, no appetite, fever, chills, body aches, started on March 29th . 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 he has not recieved COVID19 vaccine. his room mate was diagnosed with COVID19 around April 5. He presented to ED with worsening shortness of breath with productive cough ( thick sputum)  over the past 2 days prior to admission.  He was on oxygen supplement 2LPM on admission.  Hehas some left sided chest pain when he coughs.     Primary team has started him on Remdesevir and Dexamethasone . He feels better today, and his appetite is improving and able to eat today.   He currently on room air with oxygen saturation 92 %.        Lab:   transaminases were elevated    and (4/10/21) --> 74 (4/11/21)   WBC was normal on 4/10/21 with normal diff with Lymphocytes 0.9   D dimer was elevated at 4/102/21 --> 1.0 on 4/11/21     CXR 4/10/21  IMPRESSION:  1. Hypoaeration.  2. Patchy infiltrates bilateral lungs could represent COVID-19 or other atypical pneumonia.         Review of Systems:   CONSTITUTIONAL:  see HPI   EYES: negative for icterus  ENT:  negative for hearing loss, tinnitus and sore throat  RESPIRATORY: see HPI   CARDIOVASCULAR:  see HPI   GASTROINTESTINAL:  negative for nausea, vomiting, diarrhea and constipation  GENITOURINARY:  negative for dysuria  HEME:  No easy bruising  INTEGUMENT:  negative for rash and pruritus  NEURO:  see HPI          Past Medical History:     Past Medical History:   Diagnosis Date     Closed fracture of left tibia and fibula with routine healing      Depressive disorder      Hypertension           Past Surgical History:     Past Surgical History:    Procedure Laterality Date     NO HISTORY OF SURGERY            Family History:     Family History   Problem Relation Age of Onset     Hypertension Father      Depression Sister      Depression Sister           Social History:     Social History     Tobacco Use     Smoking status: Former Smoker     Packs/day: 0.50     Years: 10.00     Pack years: 5.00     Quit date: 1/13/2006     Years since quitting: 15.2     Smokeless tobacco: Never Used   Substance Use Topics     Alcohol use: Yes     Frequency: 2-4 times a month     Drinks per session: 1 or 2     History   Sexual Activity     Sexual activity: Not Currently     Partners: Female          Current Medications (antimicrobials listed in bold):       sodium chloride 0.9%  50 mL Intravenous Once     remdesivir  100 mg Intravenous Q24H    And     sodium chloride 0.9%  50 mL Intravenous Q24H     dexamethasone  6 mg Oral Daily     enoxaparin ANTICOAGULANT  40 mg Subcutaneous Q24H     eszopiclone  3 mg Oral At Bedtime     lisinopril  10 mg Oral Daily     nortriptyline  150 mg Oral At Bedtime     sodium chloride (PF)  3 mL Intracatheter Q8H          Allergies:   No Known Allergies         Physical Exam:   Vitals were reviewed  Patient Vitals for the past 24 hrs:   BP Temp Temp src Pulse Resp SpO2 Weight   04/11/21 0811 (!) 140/86 95.6  F (35.3  C) Oral 90 16 92 % --   04/11/21 0018 106/60 97.2  F (36.2  C) Oral 80 18 95 % --   04/10/21 2040 124/68 98.5  F (36.9  C) Oral 86 18 93 % --   04/10/21 2004 120/83 98.6  F (37  C) Oral 88 18 99 % 104.3 kg (230 lb)   04/10/21 1600 -- -- -- -- -- 96 % --   04/10/21 1512 102/82 98.9  F (37.2  C) Oral 96 18 93 % --     Ranges for his vital signs:  Temp:  [95.6  F (35.3  C)-98.9  F (37.2  C)] 95.6  F (35.3  C)  Pulse:  [80-96] 90  Resp:  [16-18] 16  BP: (102-140)/(60-86) 140/86  SpO2:  [92 %-99 %] 92 %    Physical Examination:  GENERAL:  well-developed, well-nourished, in bed in no acute distress. looks lethargic but able to answer  questions   HEENT:  Head is normocephalic, atraumatic   EYES:  Eyes have anicteric sclerae without conjunctival injection  NECK:  Supple.  LUNGS: no coughing when seen, speaks in full sentences   SKIN:  No acute rashes.    NEUROLOGIC:  alert, oriented          Laboratory Data:     Inflammatory Markers    Recent Labs   Lab Test 04/11/21  0644 04/10/21  1602   CRP 74.0* 100.0*     Hematology Studies    Recent Labs   Lab Test 04/11/21  0644 04/10/21  1602 01/11/21  1012   WBC 2.1* 4.8 5.6   ANEU  --  3.5  --    AEOS  --  0.0  --    HGB 14.0 14.4 15.2   MCV 81 82 81    216 233     Metabolic Studies     Recent Labs   Lab Test 04/11/21  0644 04/10/21  1602 01/11/21  1012    136 139   POTASSIUM 3.9 3.8 4.0   CHLORIDE 105 98 107   CO2 29 29 26   BUN 12 10 16   CR 0.74 0.78 1.02   GFRESTIMATED >90 >90 >90     Hepatic Studies    Recent Labs   Lab Test 04/10/21  1602 01/11/21  1012   BILITOTAL 0.8 0.5   ALKPHOS 144 78   ALBUMIN 2.9* 4.1   * 33   * 78*     Urine Studies    Recent Labs   Lab Test 01/11/21  1030   LEUKEST Negative     Hepatitis C Testing     Hepatitis C Antibody   Date Value Ref Range Status   01/11/2021 Nonreactive NR^Nonreactive Final     Comment:     Assay performance characteristics have not been established for newborns,   infants, and children

## 2021-04-11 NOTE — ED NOTES
[facility]   ED Nurse to Floor Handoff     Roger Law is a 40 year old male who speaks English and lives unknown,  home status is unknown  They arrived in the ED by car from home    ED Chief Complaint: No chief complaint on file.    ED Dx;   Final diagnoses:   Pneumonia due to 2019 novel coronavirus         Needed?: No    Allergies: No Known Allergies.  Past Medical Hx:   Past Medical History:   Diagnosis Date     Closed fracture of left tibia and fibula with routine healing      Depressive disorder      Hypertension       Baseline Mental status: WDL  Current Mental Status changes: at basesline    Infection present or suspected this encounter: yes COVID r/o and special precautions  Sepsis suspected: No  Isolation type: No active isolations  Patient tested for COVID 19 prior to admission: YES     Activity level - Baseline/Home:  Independent  Activity Level - Current:   Independent    Bariatric equipment needed?: No    In the ED these meds were given:   Medications   0.9% sodium chloride BOLUS (has no administration in time range)     Followed by   sodium chloride 0.9% infusion (has no administration in time range)   sodium chloride 0.9 % infusion (has no administration in time range)   remdesivir 200 mg in sodium chloride 0.9 % 250 mL intermittent infusion (200 mg Intravenous New Bag 4/10/21 1846)     Followed by   0.9% sodium chloride BOLUS (has no administration in time range)   dexamethasone (DECADRON) injection 6 mg (6 mg Intravenous Given 4/10/21 1841)       Drips running?  Yes    Home pump  No    Current LDAs  Peripheral IV 04/10/21 Left Other (Comment) (Active)   Site Assessment WDL 04/10/21 1609   Line Status Saline locked 04/10/21 1609   Phlebitis Scale 0-->no symptoms 04/10/21 1609   Infiltration Scale 0 04/10/21 1609   Number of days: 0       Labs results:   Labs Ordered and Resulted from Time of ED Arrival Up to the Time of Departure from the  ED   COMPREHENSIVE METABOLIC PANEL - Abnormal; Notable for the following components:       Result Value    Glucose 104 (*)     Calcium 8.1 (*)     Albumin 2.9 (*)      (*)      (*)     All other components within normal limits   CBC WITH PLATELETS DIFFERENTIAL   CK TOTAL   CRP INFLAMMATION   D DIMER QUANTITATIVE   MAY SALINE LOCK IV   IP ASSIGN PROVIDER TEAM TO TREATMENT TEAM       Imaging Studies:   Recent Results (from the past 24 hour(s))   XR Chest Port 1 View    Narrative    CHEST ONE VIEW PORTABLE  4/10/2021 4:25 PM     HISTORY: COVID positive.    COMPARISON: None.    FINDINGS:  The lungs are hypoaerated. There are subtle patchy  infiltrates bilateral lungs throughout the lungs. No pneumothorax or  significant pleural fluid collection. No acute fracture.      Impression    IMPRESSION:  1. Hypoaeration.  2. Patchy infiltrates bilateral lungs could represent COVID-19 or  other atypical pneumonia.       Recent vital signs:   /82   Pulse 96   Temp 98.9  F (37.2  C) (Oral)   Resp 18   SpO2 96%     Hailey Coma Scale Score: 15 (04/10/21 1512)       Cardiac Rhythm: Other  Pt needs tele? No  Skin/wound Issues: None    Code Status: Full Code    Pain control: good    Nausea control: pt had none    Abnormal labs/tests/findings requiring intervention: see epic    Family present during ED course? No   Family Comments/Social Situation comments: none    Tasks needing completion: None    LEXII BLEVINS RN  [facility] -- [phone] [facility]  [phone]

## 2021-04-11 NOTE — PLAN OF CARE
"  VS: /74   Pulse 87   Temp 97.2  F (36.2  C) (Oral)   Resp 18   Wt 104.3 kg (230 lb)   SpO2 90%   BMI 31.19 kg/m     O2: >90% on room air. Infrequent cough and SOB with exertion    Output: Voiding spontaneously in bathroom    Last BM: Unknown per patient report. Bowel sounds active and patient passing flatus   Activity: Up ad akira in room    Skin: Intact    Pain: Denies    CMS: Intact    Dressing: NA   Diet: Regular diet    LDA: PIV saline locked    Equipment: IV pole/pump, call light within patient reach    Plan: Per MD note \"recommended to prior living arrangement once COVID severity labs are improving and patinet is feeling better\".    Additional Info:        "

## 2021-04-12 LAB
ALP SERPL-CCNC: 144 U/L (ref 40–150)
ALT SERPL W P-5'-P-CCNC: 129 U/L (ref 0–70)
ANION GAP SERPL CALCULATED.3IONS-SCNC: 5 MMOL/L (ref 3–14)
AST SERPL W P-5'-P-CCNC: 96 U/L (ref 0–45)
BILIRUB DIRECT SERPL-MCNC: 0.2 MG/DL (ref 0–0.2)
BILIRUB SERPL-MCNC: 0.5 MG/DL (ref 0.2–1.3)
BUN SERPL-MCNC: 14 MG/DL (ref 7–30)
CALCIUM SERPL-MCNC: 8.4 MG/DL (ref 8.5–10.1)
CHLORIDE SERPL-SCNC: 110 MMOL/L (ref 94–109)
CO2 SERPL-SCNC: 26 MMOL/L (ref 20–32)
CREAT SERPL-MCNC: 0.71 MG/DL (ref 0.66–1.25)
CRP SERPL-MCNC: 27 MG/L (ref 0–8)
D DIMER PPP FEU-MCNC: 0.6 UG/ML FEU (ref 0–0.5)
ERYTHROCYTE [DISTWIDTH] IN BLOOD BY AUTOMATED COUNT: 13.9 % (ref 10–15)
FIBRINOGEN PPP-MCNC: 475 MG/DL (ref 200–420)
GFR SERPL CREATININE-BSD FRML MDRD: >90 ML/MIN/{1.73_M2}
GLUCOSE SERPL-MCNC: 124 MG/DL (ref 70–99)
HCT VFR BLD AUTO: 40.6 % (ref 40–53)
HGB BLD-MCNC: 13.8 G/DL (ref 13.3–17.7)
MCH RBC QN AUTO: 27.4 PG (ref 26.5–33)
MCHC RBC AUTO-ENTMCNC: 34 G/DL (ref 31.5–36.5)
MCV RBC AUTO: 81 FL (ref 78–100)
PLATELET # BLD AUTO: 309 10E9/L (ref 150–450)
POTASSIUM SERPL-SCNC: 3.8 MMOL/L (ref 3.4–5.3)
RBC # BLD AUTO: 5.04 10E12/L (ref 4.4–5.9)
SODIUM SERPL-SCNC: 141 MMOL/L (ref 133–144)
WBC # BLD AUTO: 8.3 10E9/L (ref 4–11)

## 2021-04-12 PROCEDURE — 82248 BILIRUBIN DIRECT: CPT | Performed by: INTERNAL MEDICINE

## 2021-04-12 PROCEDURE — 84450 TRANSFERASE (AST) (SGOT): CPT | Performed by: INTERNAL MEDICINE

## 2021-04-12 PROCEDURE — 36415 COLL VENOUS BLD VENIPUNCTURE: CPT | Performed by: INTERNAL MEDICINE

## 2021-04-12 PROCEDURE — 82247 BILIRUBIN TOTAL: CPT | Performed by: INTERNAL MEDICINE

## 2021-04-12 PROCEDURE — 250N000009 HC RX 250: Performed by: INTERNAL MEDICINE

## 2021-04-12 PROCEDURE — 85027 COMPLETE CBC AUTOMATED: CPT | Performed by: INTERNAL MEDICINE

## 2021-04-12 PROCEDURE — 85379 FIBRIN DEGRADATION QUANT: CPT | Performed by: INTERNAL MEDICINE

## 2021-04-12 PROCEDURE — 250N000011 HC RX IP 250 OP 636: Performed by: INTERNAL MEDICINE

## 2021-04-12 PROCEDURE — 87340 HEPATITIS B SURFACE AG IA: CPT | Performed by: INTERNAL MEDICINE

## 2021-04-12 PROCEDURE — 120N000002 HC R&B MED SURG/OB UMMC

## 2021-04-12 PROCEDURE — 250N000013 HC RX MED GY IP 250 OP 250 PS 637: Performed by: INTERNAL MEDICINE

## 2021-04-12 PROCEDURE — 86140 C-REACTIVE PROTEIN: CPT | Performed by: INTERNAL MEDICINE

## 2021-04-12 PROCEDURE — 258N000003 HC RX IP 258 OP 636

## 2021-04-12 PROCEDURE — 86706 HEP B SURFACE ANTIBODY: CPT | Performed by: INTERNAL MEDICINE

## 2021-04-12 PROCEDURE — 258N000003 HC RX IP 258 OP 636: Performed by: INTERNAL MEDICINE

## 2021-04-12 PROCEDURE — 99233 SBSQ HOSP IP/OBS HIGH 50: CPT | Performed by: INTERNAL MEDICINE

## 2021-04-12 PROCEDURE — 84460 ALANINE AMINO (ALT) (SGPT): CPT | Performed by: INTERNAL MEDICINE

## 2021-04-12 PROCEDURE — 84075 ASSAY ALKALINE PHOSPHATASE: CPT | Performed by: INTERNAL MEDICINE

## 2021-04-12 PROCEDURE — 85384 FIBRINOGEN ACTIVITY: CPT | Performed by: INTERNAL MEDICINE

## 2021-04-12 PROCEDURE — 99207 PR CDG-HISTORY COMPONENT: MEETS DETAILED - UP CODED: CPT | Performed by: INTERNAL MEDICINE

## 2021-04-12 PROCEDURE — 99233 SBSQ HOSP IP/OBS HIGH 50: CPT | Mod: GC | Performed by: INTERNAL MEDICINE

## 2021-04-12 PROCEDURE — 250N000012 HC RX MED GY IP 250 OP 636 PS 637: Performed by: INTERNAL MEDICINE

## 2021-04-12 PROCEDURE — 86704 HEP B CORE ANTIBODY TOTAL: CPT | Performed by: INTERNAL MEDICINE

## 2021-04-12 PROCEDURE — 80048 BASIC METABOLIC PNL TOTAL CA: CPT | Performed by: INTERNAL MEDICINE

## 2021-04-12 RX ORDER — SODIUM CHLORIDE 9 MG/ML
INJECTION, SOLUTION INTRAVENOUS
Status: COMPLETED
Start: 2021-04-12 | End: 2021-04-12

## 2021-04-12 RX ADMIN — ENOXAPARIN SODIUM 40 MG: 100 INJECTION SUBCUTANEOUS at 20:48

## 2021-04-12 RX ADMIN — Medication 20 MG: at 22:46

## 2021-04-12 RX ADMIN — SODIUM CHLORIDE 50 ML: 9 INJECTION, SOLUTION INTRAVENOUS at 20:49

## 2021-04-12 RX ADMIN — DEXAMETHASONE 6 MG: 2 TABLET ORAL at 12:46

## 2021-04-12 RX ADMIN — NORTRIPTYLINE HYDROCHLORIDE 150 MG: 75 CAPSULE ORAL at 22:46

## 2021-04-12 RX ADMIN — SODIUM CHLORIDE, PRESERVATIVE FREE: 5 INJECTION INTRAVENOUS at 20:48

## 2021-04-12 RX ADMIN — REMDESIVIR 100 MG: 100 INJECTION, POWDER, LYOPHILIZED, FOR SOLUTION INTRAVENOUS at 20:46

## 2021-04-12 RX ADMIN — ESZOPICLONE 3 MG: 3 TABLET, FILM COATED OROPHARYNGEAL at 22:46

## 2021-04-12 NOTE — PLAN OF CARE
"VS: Temp: 95.6  F (35.3  C) Temp src: Oral BP: 121/78 Pulse: 73   Resp: 18 SpO2: 92 % O2 Device: None (Room air)      O2: 92% on room air. Infrequent cough and SOB with exertion . Pt denies chest pain.   Output: Voids spontaneously without difficulty  in bathroom    Last BM:    Activity: Independent    Skin: Intact    Pain: Denies    CMS: Intact    Dressing: NA   Diet: Regular diet    LDA: PIV saline locked    Equipment: IV pole/pump, call light within patient reach and personal belongings    Plan: Per MD note \"recommended to prior living arrangement once COVID severity labs are improving and patinet is feeling better\".    Additional Info:          "

## 2021-04-12 NOTE — PROGRESS NOTES
VS: /70 (BP Location: Right arm)   Pulse 69   Temp 96.4  F (35.8  C) (Oral)   Resp 17   Wt 104.3 kg (230 lb)   SpO2 92%   BMI 31.19 kg/m     O2: Room air saturations 92%. Taught patient about use of IS. Encouraged patient to sit up in chair and walk around in room to increase activity.    Output: Up to bathroom. Reports no issues.    Last BM: 1 week ago. Reported issue off to Dr Delgado   Activity: Up ad akira   Skin: Intact   Pain: Denies   CMS: Intact   Dressing: NA   Diet: Regular. Good po intake. Denies nausea   LDA: IV SL   Equipment: Isolation for positive Covid   Plan: Patient will discharge tomorrow after having labs drawn.    Additional Info: Pt lives with x 2 room mates. One room mate has Covid currently. Patient has his car parked in [place] when he is discharged to home. Pt is pleasant and seems to be resting comfortably in room. Status of patient will continue to be monitored

## 2021-04-12 NOTE — PROGRESS NOTES
[facility] GENERAL INFECTIOUS DISEASES PROGRESS NOTE  Patient:  Roger Law   Date of birth 1980, Medical record number [id number]  Date of Visit:  04/12/2021  Date of Admission: 4/10/2021      ATTESTATION:    I, Torri Leggett, saw and evaluated Mr. Roger Law with Dr. Neena Sheldon.  I have reviewed and discussed with Dr. Neena Sheldon the patient's history, physical and plan.     I personally reviewed the vital signs, medications, labs, and imaging.     Dr. Neena Sheldon's excellent note with physical exam, assessment/plan, discussion and recommendations reflect our joint thought process, differential diagnosis and recommended work up. Please see Dr. Neena Sheldon's note for detailed recommendations.     Torri Leggett MD  04/12/21         Assessment and Recommendations:     Roger Law is a 40 year old male with a past medical history significant for hypertension, depression admitted on 4/10/21 for shortness of breath, cough and diagnosed with COVID19 on April 9 ( mail-in test, result via email).      1. COVID19 ( diagnosed on April 9)   Symptoms started around March 29. CXR 4/10 showed hypoaeration, patchy infiltrates bilateral lungs that could represent COVID-19 or other atypical pneumonia.  D dimer was elevated, on lovenox 40mg SQ daily. Was on oxygen supplementation at 2LPM on admission. Started on Remdesevir and dexamethasone on 4/10/21. Overall with improved symptoms. Is off O2, sats appear to be at 92%. Afebrile, HDS. -->74-->27.    Noted that LFTs were elevated upon admission on 4/10 (AST: 108 and ALT; 136) however less than 3x the normal range. Patient did have mildly elevated ALT at 78 back in January 2021. I do not see abdominal US in the system. He had a Hep C and HIV serology negative from January 2021.     RECOMMENDATION:  1. Continue Remdesevir 100 mg IV daily (after loading dose 200 mg IV on 4/10), goal 5 days of treatment (end date 4/14/21)           - If the patient is discharged before the day 5 (4/10), it can be stopped early   2. Continue dexamethasone 6 mg po daily ( IV/PO for 10 days)           - It can be given as oral dose if patient is discharged before the end of therapy - end date of therapy -> 4/19/21  3. Monitor LFTs and renal function while on Remdesivir - added LFTs to the labs from today and ordered labs for tomorrow          - Also added Hep B to the labs from today for completeness    Pt and plan of care discussed with Staff ID Physician Dr Torri Sheldon  [id number] ID Fellow  Pager: [phone]        Interval events:     Reports improved symptoms. Is off O2, sats appear to be at 92%. Afebrile, HDS. -->74-->27         Initial History of Present Illness:     Roger Law is a 40 year old male with a past medical history significant for hypertension, depression admitted on 4/10/21 for shortness of breath, cough and diagnosed with COVID19 on April 9 ( mail-in test, result via email). symptoms such as fatigue, exhaustion, headaches, no appetite, fever, chills, body aches, started on March 29th . 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 he has not recieved COVID19 vaccine. his room mate was diagnosed with COVID19 around April 5. He presented to ED with worsening shortness of breath with productive cough ( thick sputum)  over the past 2 days prior to admission.  He was on oxygen supplement 2LPM on admission.  Hehas some left sided chest pain when he coughs.     Primary team has started him on Remdesevir and Dexamethasone . He feels better today, and his appetite is improving and able to eat today.   He currently on room air with oxygen saturation 92 %.        Lab:   transaminases were elevated    and (4/10/21) --> 74 (4/11/21)   WBC was normal on 4/10/21 with normal diff with Lymphocytes 0.9   D dimer was elevated at 4/102/21 --> 1.0 on 4/11/21             Review of Systems:   CONSTITUTIONAL:  see HPI   EYES: negative for icterus  ENT:  negative for hearing loss, tinnitus and sore throat  RESPIRATORY: see HPI   CARDIOVASCULAR:  see HPI   GASTROINTESTINAL:  negative for nausea, vomiting, diarrhea and constipation  GENITOURINARY:  negative for dysuria  HEME:  No easy bruising  INTEGUMENT:  negative for rash and pruritus  NEURO:  see HPI          Past Medical History:     Past Medical History:   Diagnosis Date     Closed fracture of left tibia and fibula with routine healing      Depressive disorder      Hypertension           Past Surgical History:     Past Surgical History:   Procedure Laterality Date     NO HISTORY OF SURGERY            Family History:     Family History   Problem Relation Age of Onset     Hypertension Father      Depression Sister      Depression Sister           Social History:     Social History     Tobacco Use     Smoking status: Former Smoker     Packs/day: 0.50     Years: 10.00     Pack years: 5.00     Quit date: 1/13/2006     Years since quitting: 15.2     Smokeless tobacco: Never Used   Substance Use Topics     Alcohol use: Yes     Frequency: 2-4 times a month     Drinks per session: 1 or 2     History   Sexual Activity     Sexual activity: Not Currently     Partners: Female          Current Medications (antimicrobials listed in bold):       sodium chloride 0.9%  50 mL Intravenous Once     remdesivir  100 mg Intravenous Q24H    And     sodium chloride 0.9%  50 mL Intravenous Q24H     dexamethasone  6 mg Oral Daily     enoxaparin ANTICOAGULANT  40 mg Subcutaneous Q24H     eszopiclone  3 mg Oral At Bedtime     lisinopril  10 mg Oral Daily     nortriptyline  150 mg Oral At Bedtime     sodium chloride (PF)  3 mL Intracatheter Q8H          Allergies:   No Known Allergies         Physical Exam:   Vitals were reviewed  Patient Vitals for the past 24 hrs:   BP Temp Temp src Pulse Resp SpO2   04/12/21 0633 107/70 96.4  F (35.8  C) Oral 69 17  92 %   04/12/21 0212 121/78 95.6  F (35.3  C) Oral 73 18 92 %   04/11/21 1430 115/74 97.2  F (36.2  C) Oral 87 18 90 %     Ranges for his vital signs:  Temp:  [95.6  F (35.3  C)-97.2  F (36.2  C)] 96.4  F (35.8  C)  Pulse:  [69-87] 69  Resp:  [17-18] 17  BP: (107-121)/(70-78) 107/70  SpO2:  [90 %-92 %] 92 %    Physical Examination:  GENERAL:  well-developed, well-nourished, in bed in no acute distress. l  HEENT:  Head is normocephalic, atraumatic   EYES:  Eyes have anicteric sclerae without conjunctival injection  NECK:  Supple.  LUNGS: no coughing when seen, speaks in full sentences   SKIN:  No acute rashes.    NEUROLOGIC:  No gross FNDs         Laboratory Data:     Inflammatory Markers    Recent Labs   Lab Test 04/12/21  0614 04/11/21  0644 04/10/21  1602   CRP 27.0* 74.0* 100.0*     Hematology Studies    Recent Labs   Lab Test 04/12/21  0614 04/11/21  0644 04/10/21  1602 01/11/21  1012   WBC 8.3 2.1* 4.8 5.6   ANEU  --   --  3.5  --    AEOS  --   --  0.0  --    HGB 13.8 14.0 14.4 15.2   MCV 81 81 82 81    239 216 233     Metabolic Studies     Recent Labs   Lab Test 04/12/21  0614 04/11/21  0644 04/10/21  1602 01/11/21  1012    139 136 139   POTASSIUM 3.8 3.9 3.8 4.0   CHLORIDE 110* 105 98 107   CO2 26 29 29 26   BUN 14 12 10 16   CR 0.71 0.74 0.78 1.02   GFRESTIMATED >90 >90 >90 >90     Hepatic Studies    Recent Labs   Lab Test 04/10/21  1602 01/11/21  1012   BILITOTAL 0.8 0.5   ALKPHOS 144 78   ALBUMIN 2.9* 4.1   * 33   * 78*     Urine Studies    Recent Labs   Lab Test 01/11/21  1030   LEUKEST Negative     Hepatitis C Testing     Hepatitis C Antibody   Date Value Ref Range Status   01/11/2021 Nonreactive NR^Nonreactive Final     Comment:     Assay performance characteristics have not been established for newborns,   infants, and children       CXR 4/10/21  IMPRESSION:  1. Hypoaeration.  2. Patchy infiltrates bilateral lungs could represent COVID-19 or other atypical pneumonia.

## 2021-04-12 NOTE — PROGRESS NOTES
[facility]    Medicine Progress Note - Hospitalist Service       Date of Admission:  4/10/2021  Assessment & Plan         Roger Law is a 40 year old male with a past medical history significant for hypertension, depression admitted on 4/10/21 for shortness of breath, cough and diagnosed with COVID19 on April 9 ( mail-in test, result via email).       Acute Hypoxic respiratory Failure  COVID-19  Pneumonia  Elevated LFTs-trending down.    Symptoms started around March 29. CXR 4/10 showed hypoaeration, patchy infiltrates bilateral lungs that could represent COVID-19 or other atypical pneumonia.  D dimer was elevated. Was on oxygen supplementation at 2LPM on admission. Started on Remdesevir and dexamethasone on 4/10/21. Overall with improved symptoms. Is off O2, sats appear to be at 92%. Afebrile, HDS. -->74-->27.  Noted that LFTs were elevated upon admission on 4/10 (AST: 108 and ALT; 136)-now trending down.  Patient had hep C and HIV serology negative from January 2021.  ID consultation.  Appreciate ID input.  Discussed with ID.    ID recommendations: 04/12:   1. Continue Remdesevir 100 mg IV daily (after loading dose 200 mg IV on 4/10), goal 5 days of treatment (end date 4/14/21)          - If the patient is discharged before the day 5 (4/10), it can be stopped early   2. Continue dexamethasone 6 mg po daily ( IV/PO for 10 days)           - It can be given as oral dose if patient is discharged before the end of therapy - end date of therapy -> 4/19/21  3. Monitor LFTs and renal function while on Remdesivir   -Follow hepatitis serology.  Other supportive cares.  Incentive spirometry   DVT prophylaxis with Lovenox subcu.        Insomnia:   Resume home dose of Lunesta 3 mg and melatonin 20 mg      HTN:   Resume home dose of Lisinopril 10 mg, monitor    Depression:  Mood stable.  Resume home dose of Nortriptyline          Diet: Combination Diet Regular Diet  Adult    DVT Prophylaxis: Enoxaparin (Lovenox) SQ  Curiel Catheter: not present  Code Status: Full Code           Disposition Plan   Expected discharge: 1 to 2 days, recommended to prior living arrangement , the patient continues to improve with no hypoxia.  Entered: Josep Delgado MD 04/12/2021, 10:14 AM       The patient's care was discussed with the Bedside Nurse, Care Coordinator/, Patient and Infectious disease Team.    Josep Delgado MD  Hospitalist Service  [facility]  Contact information available via [facility] Paging/Directory    ______________________________________________________________________    Interval History   Interval events reviewed.   States doing better  Mild intermittent, dry cough   denies fever  No cp or sob.   Generalized malaise  No NV or pain abdomen.     No other new or acute medical concern      Data reviewed today: I reviewed all medications, new labs and imaging results over the last 24 hours. I personally reviewed no images or EKG's today.    Physical Exam   Vital Signs: Temp: 96.4  F (35.8  C) Temp src: Oral BP: 107/70 Pulse: 69   Resp: 17 SpO2: 92 % O2 Device: None (Room air)    Weight: 230 lbs 0 oz    General Appearance: Awake, interactive, NAD  Respiratory: Normal work of breathing.  On room air.   Cardiovascular: RRR  GI: Nondistended   Extremities:  No pedal edema  Skin: No acute rash on exposed areas.   Other: A0 x 4.  Grossly nonfocal    Data   Recent Labs   Lab 04/12/21  0614 04/11/21  0644 04/10/21  1602   WBC 8.3 2.1* 4.8   HGB 13.8 14.0 14.4   MCV 81 81 82    239 216    139 136   POTASSIUM 3.8 3.9 3.8   CHLORIDE 110* 105 98   CO2 26 29 29   BUN 14 12 10   CR 0.71 0.74 0.78   ANIONGAP 5 5 9   SIA 8.4* 8.3* 8.1*   * 130* 104*   ALBUMIN  --   --  2.9*   PROTTOTAL  --   --  7.0   BILITOTAL 0.5  --  0.8   ALKPHOS 144  --  144   *  --  136*   AST 96*  --  108*   TROPI  --  <0.015  --      No  results found for this or any previous visit (from the past 24 hour(s)).  Medications     - MEDICATION INSTRUCTIONS -         sodium chloride 0.9%  50 mL Intravenous Once     remdesivir  100 mg Intravenous Q24H    And     sodium chloride 0.9%  50 mL Intravenous Q24H     dexamethasone  6 mg Oral Daily     enoxaparin ANTICOAGULANT  40 mg Subcutaneous Q24H     eszopiclone  3 mg Oral At Bedtime     lisinopril  10 mg Oral Daily     nortriptyline  150 mg Oral At Bedtime     sodium chloride (PF)  3 mL Intracatheter Q8H

## 2021-04-12 NOTE — PROGRESS NOTES
CLINICAL NUTRITION SERVICES - ASSESSMENT NOTE     Nutrition Prescription    RECOMMENDATIONS FOR MDs/PROVIDERS TO ORDER:  None today    Malnutrition Status:    Patient does not meet two of the established criteria necessary for diagnosing malnutrition    Recommendations already ordered by Registered Dietitian (RD):  Diet education     Future/Additional Recommendations:  Monitor intakes and wt  Snacks per pt request      REASON FOR ASSESSMENT  Roger Law is a 40 year old male assessed by the dietitian for MST score of 3: positive  for wt loss of unsure and positive  for poor PO intake R/t decreased appetite  PMH significant for HTN and depression admitted for COVID pneumonia.  NUTRITION HISTORY  Pt reports a decreased appetite/intakes since 3/30. Stated he was mainly eating snack foods at home such as biscuits, cereal and beef jerky. Reports a UBW of 235#.    CURRENT NUTRITION ORDERS  Diet: Regular  Intake/Tolerance: Reports an improved appetite since admit. Stated he has been eating 100% of his trays. Denies N/V/C/D or difficulty chewing/swallowing. Offered snacks however pt declined at this time. Possible discharge tomorrow.     LABS  Labs reviewed    MEDICATIONS  Medications reviewed:  Dexamethasone  remdesivir  PRN: zofran    ANTHROPOMETRICS  Height: 182.9 cm (6')  Most Recent Weight: 104.3 kg (230 lb)    IBW: 80.8 kg  BMI: 31.19 kg/m^2,Obesity Grade I BMI 30-34.9  Weight History: 10# (4.2%) wt loss in 3 months   Wt Readings from Last 10 Encounters:   04/10/21 104.3 kg (230 lb)   01/11/21 108.9 kg (240 lb 0.5 oz)   08/26/20 103 kg (227 lb)     Dosing Weight: 86.7 kg (adjusted using current wt of 104.3 kg and ideal wt of 80.8 kg)    ASSESSED NUTRITION NEEDS  Estimated Energy Needs: 9072-5673 kcals/day (20 - 25 kcals/kg)  Justification: Obese  Estimated Protein Needs: 104-130 grams protein/day (1.2 - 1.5 grams of pro/kg)  Justification: Hypercatabolism with acute illness and Obesity guidelines  Estimated Fluid  Needs: 5127-4611 mL/day (1 mL/kcal)   Justification: Maintenance or Per provider pending fluid status    PHYSICAL FINDINGS  See malnutrition section below.    MALNUTRITION  % Intake: Decreased intake does not meet criteria  % Weight Loss: Weight loss does not meet criteria  Subcutaneous Fat Loss: Unable to assess  Muscle Loss: Unable to assess  Fluid Accumulation/Edema: None noted  Malnutrition Diagnosis: Patient does not meet two of the established criteria necessary for diagnosing malnutrition    NUTRITION DIAGNOSIS  Predicted inadequate nutrient intake (protein-energy) related to decreased appetite in setting of acute illness as evidenced by pt reported intakes PTA with improvements since admit       INTERVENTIONS  Implementation  Nutrition Education: Discussed role of RD, menu ordering and available snacks/supplements. Discussed increased needs with acute illness. Encouraged adequate protein and frequent meals and snacks      Goals  Patient to consume % of nutritionally adequate meal trays TID, or the equivalent with supplements/snacks.     Monitoring/Evaluation  Progress toward goals will be monitored and evaluated per protocol.    Gilma Clemons MS, RD, LDN  Unit Pager [phone]

## 2021-04-13 ENCOUNTER — PATIENT OUTREACH (OUTPATIENT)
Dept: CARE COORDINATION | Facility: CLINIC | Age: 41
End: 2021-04-13

## 2021-04-13 VITALS
BODY MASS INDEX: 31.19 KG/M2 | SYSTOLIC BLOOD PRESSURE: 153 MMHG | OXYGEN SATURATION: 96 % | WEIGHT: 230 LBS | DIASTOLIC BLOOD PRESSURE: 87 MMHG | RESPIRATION RATE: 18 BRPM | HEART RATE: 60 BPM | TEMPERATURE: 95.9 F

## 2021-04-13 LAB
ALBUMIN SERPL-MCNC: 2.8 G/DL (ref 3.4–5)
ALP SERPL-CCNC: 160 U/L (ref 40–150)
ALT SERPL W P-5'-P-CCNC: 197 U/L (ref 0–70)
ANION GAP SERPL CALCULATED.3IONS-SCNC: 6 MMOL/L (ref 3–14)
AST SERPL W P-5'-P-CCNC: 89 U/L (ref 0–45)
BILIRUB SERPL-MCNC: 0.4 MG/DL (ref 0.2–1.3)
BUN SERPL-MCNC: 14 MG/DL (ref 7–30)
CALCIUM SERPL-MCNC: 8 MG/DL (ref 8.5–10.1)
CHLORIDE SERPL-SCNC: 111 MMOL/L (ref 94–109)
CO2 SERPL-SCNC: 24 MMOL/L (ref 20–32)
CREAT SERPL-MCNC: 0.7 MG/DL (ref 0.66–1.25)
CRP SERPL-MCNC: 14 MG/L (ref 0–8)
D DIMER PPP FEU-MCNC: 0.5 UG/ML FEU (ref 0–0.5)
ERYTHROCYTE [DISTWIDTH] IN BLOOD BY AUTOMATED COUNT: 13.7 % (ref 10–15)
FIBRINOGEN PPP-MCNC: 427 MG/DL (ref 200–420)
GFR SERPL CREATININE-BSD FRML MDRD: >90 ML/MIN/{1.73_M2}
GLUCOSE SERPL-MCNC: 114 MG/DL (ref 70–99)
HBV CORE AB SERPL QL IA: NONREACTIVE
HBV SURFACE AB SERPL IA-ACNC: 0.87 M[IU]/ML
HBV SURFACE AG SERPL QL IA: NONREACTIVE
HCT VFR BLD AUTO: 40.5 % (ref 40–53)
HGB BLD-MCNC: 13.8 G/DL (ref 13.3–17.7)
MCH RBC QN AUTO: 27.5 PG (ref 26.5–33)
MCHC RBC AUTO-ENTMCNC: 34.1 G/DL (ref 31.5–36.5)
MCV RBC AUTO: 81 FL (ref 78–100)
PLATELET # BLD AUTO: 339 10E9/L (ref 150–450)
POTASSIUM SERPL-SCNC: 3.4 MMOL/L (ref 3.4–5.3)
PROT SERPL-MCNC: 6.4 G/DL (ref 6.8–8.8)
RBC # BLD AUTO: 5.02 10E12/L (ref 4.4–5.9)
SODIUM SERPL-SCNC: 141 MMOL/L (ref 133–144)
WBC # BLD AUTO: 11.5 10E9/L (ref 4–11)

## 2021-04-13 PROCEDURE — 85384 FIBRINOGEN ACTIVITY: CPT | Performed by: INTERNAL MEDICINE

## 2021-04-13 PROCEDURE — 36415 COLL VENOUS BLD VENIPUNCTURE: CPT | Performed by: STUDENT IN AN ORGANIZED HEALTH CARE EDUCATION/TRAINING PROGRAM

## 2021-04-13 PROCEDURE — 85379 FIBRIN DEGRADATION QUANT: CPT | Performed by: INTERNAL MEDICINE

## 2021-04-13 PROCEDURE — 36415 COLL VENOUS BLD VENIPUNCTURE: CPT | Performed by: INTERNAL MEDICINE

## 2021-04-13 PROCEDURE — 250N000013 HC RX MED GY IP 250 OP 250 PS 637: Performed by: INTERNAL MEDICINE

## 2021-04-13 PROCEDURE — 99233 SBSQ HOSP IP/OBS HIGH 50: CPT | Performed by: INTERNAL MEDICINE

## 2021-04-13 PROCEDURE — 80053 COMPREHEN METABOLIC PANEL: CPT | Performed by: STUDENT IN AN ORGANIZED HEALTH CARE EDUCATION/TRAINING PROGRAM

## 2021-04-13 PROCEDURE — 99239 HOSP IP/OBS DSCHRG MGMT >30: CPT | Performed by: INTERNAL MEDICINE

## 2021-04-13 PROCEDURE — 86140 C-REACTIVE PROTEIN: CPT | Performed by: STUDENT IN AN ORGANIZED HEALTH CARE EDUCATION/TRAINING PROGRAM

## 2021-04-13 PROCEDURE — 85027 COMPLETE CBC AUTOMATED: CPT | Performed by: INTERNAL MEDICINE

## 2021-04-13 PROCEDURE — 999N000147 HC STATISTIC PT IP EVAL DEFER

## 2021-04-13 RX ORDER — DEXAMETHASONE 6 MG/1
6 TABLET ORAL DAILY
Qty: 6 TABLET | Refills: 0 | Status: SHIPPED | OUTPATIENT
Start: 2021-04-13 | End: 2022-03-01

## 2021-04-13 RX ADMIN — LISINOPRIL 10 MG: 10 TABLET ORAL at 07:53

## 2021-04-13 NOTE — PLAN OF CARE
VS: VSS  /82 (BP Location: Right arm)   Pulse 83   Temp 96.3  F (35.7  C) (Oral)   Resp 16   Wt 104.3 kg (230 lb)   SpO2 92%   BMI 31.19 kg/m     Cardiac: WNL. Denies chest pain.    O2: 92-95% on RA. LS clear bilaterally. Denies SOB.   Output: Voids spontaneously without difficulty.    Last BM: Unknown per pt report. Had not been eating much in last week due to not feeling well.    Activity: Independent w/ steady gait    Up for meals? Yes   Skin: Intact and WNL   Pain: Denies   Neuro/CMS: A+Ox4. CMS intact. Denies numbness and tingling.    Dressing: None   Diet: Regular. Good appetite-ate 100% of dinner which consisted of pancakes and omelette.    LDA: L forearm PIV, SL   Equipment: Personal belongings: cell phone  Other: IV pump/pole   Plan: Discharge to home tomorrow    Additional Info:  Covid positive on 4/9 from mail-in covid test.

## 2021-04-13 NOTE — DISCHARGE SUMMARY
DISCHARGE SUMMARY    Pt discharging to: home  Transportation:self - transport set up to help pt to red ramp   AVS given and discussed: yes COVID education and peunomnia education given to pt. Pt verbalized understanding and stated no further questions   Medications given: pt picking up medications from own pharmacy. Provider printed scripts to be faxed one signed. Provider paged to sign scripys. -Dr. Brea Parmar returned: pt stated he had all belongings for discharge   Comments:

## 2021-04-13 NOTE — PLAN OF CARE
PT 5ORTHO: no skilled PT needs indicated. Per orders pt needing home O2 test, which RN will complete. Pt up IND in room. RN in agreement with plan and will complete home O2 assessment. PT will complete orders.

## 2021-04-13 NOTE — PROGRESS NOTES
GENERAL ID SERVICE: PROGRESS NOTE  Patient:  Roger Law, Date of birth 1980, Medical record number [id number]  Date of Admission: 4/10/2021  Date of Visit:  4/13/2021         Assessment and Recommendations:   Problem list:    # Respiratory symptoms and bilateral patchy infiltrates in CXR in the setting of COVID19 ( diagnosed on April 9) - started on remdesivir and dexamethasone on 4/10 with significant improvement    # HTN    Discussion:     Mr. Roger Law is a 40 year old male with a past medical history significant for hypertension, depression admitted on 4/10/21 for shortness of breath, cough and diagnosed with COVID19 on April 9 ( mail-in test, result via email).  Symptoms started around March 29. CXR 4/10 showed hypoaeration, patchy infiltrates bilateral lungs that could represent COVID-19 or other atypical pneumonia.  D dimer was elevated, on lovenox 40mg SQ daily. Was on oxygen supplementation at 2LPM on admission. Started on Remdesevir and dexamethasone on 4/10/21. Overall with improved symptoms. He is on room air with O2 sats today at 96%. Afebrile. -->74-->27->14.     Noted that LFTs were elevated upon admission on 4/10 (AST: 108 and ALT; 136, with normal Alk phos at 144) and today ALT is slightly more elevated 197 however still not more than 3x the higher end of normal (AST is trending down). Also today alk phos is slightly elevated at 160. This is likely secondary to remdesivir. Given significant clinical improvement, the patient is being discharged today and remdesivir will be stopped. Patient had a Hep C and HIV serology negative from January 2021. Added hep B yesterday and it was negative (non-immune).      RECOMMENDATION:    1. Given significant clinical improvement, I agree with discharge today and remdesivir can be stopped (received 3 full days)         2. Please continue  dexamethasone 6 mg (switching to po or giving the equivalent dose of oral prednisone) to complete 10  days - last day of treatment will be 4/19/21    3. Medicine team is proceeding with the anticoagulation treatment per COVID protocol    4. Please repeat LFTs (alk phos, ALT, AST, total and direct bilirrubin) in 1 week (4/20) to assure improvement     5. We do not anticipate need for follow up in the ID clinic, unless new symptoms arise. Please assure the patient has a follow up with his primary care provider in a week from dismissal      5. We will sign off at this moment. Please call us back if any question         Torri Leggett MD  Date of Service: 04/13/21  Pager: [phone]             Physical Exam:   BP (!) 153/87 (BP Location: Right arm)   Pulse 60   Temp 95.9  F (35.5  C) (Oral)   Resp 18   Wt 104.3 kg (230 lb)   SpO2 96%   BMI 31.19 kg/m         Exam:  GENERAL:  Feel significantly better. Not in distress  HEAD: Normocephalic and atraumatic  ENT:  No hearing impairment, oral mucous membranes moist  EYES:  Eyes grossly normal to inspection, PERRL and conjunctivae and sclerae normal   NECK:  Supple, no adenopathy, no asymmetry  LUNGS:  Clear to auscultation - no rales, rhonchi or wheezes  CARDIOVASCULAR:  Regular rate and rhythm, normal S1 S2, no murmur  ABDOMEN:  Soft, nontender, no hepatosplenomegaly, no masses and bowel sounds normal  EXT: Extremities warm and without edema.  MS: No gross musculoskeletal defects noted, no edema  SKIN:  No acute rashes or suspicious lesions  NEUROLOGIC:  Grossly nonfocal. Normal strength and tone, mentation intact and speech normal  PSYCHIATRIC: Mood stable, mentation appears normal, affect normal  HEMATOLOGIC/LYMPHATIC: No lymphadenopathy or bleeding           Laboratory Data:     Creatinine   Date Value Ref Range Status   04/13/2021 0.70 0.66 - 1.25 mg/dL Final   04/12/2021 0.71 0.66 - 1.25 mg/dL Final   04/11/2021 0.74 0.66 - 1.25 mg/dL Final   04/10/2021 0.78 0.66 - 1.25 mg/dL Final   01/11/2021 1.02 0.66 - 1.25 mg/dL Final     WBC   Date Value Ref Range Status    04/13/2021 11.5 (H) 4.0 - 11.0 10e9/L Final   04/12/2021 8.3 4.0 - 11.0 10e9/L Final   04/11/2021 2.1 (L) 4.0 - 11.0 10e9/L Final   04/10/2021 4.8 4.0 - 11.0 10e9/L Final   01/11/2021 5.6 4.0 - 11.0 10e9/L Final     Hemoglobin   Date Value Ref Range Status   04/13/2021 13.8 13.3 - 17.7 g/dL Final     Platelet Count   Date Value Ref Range Status   04/13/2021 339 150 - 450 10e9/L Final     Lab Results   Component Value Date     04/13/2021    BUN 14 04/13/2021    CO2 24 04/13/2021     CRP Inflammation   Date Value Ref Range Status   04/13/2021 14.0 (H) 0.0 - 8.0 mg/L Final   04/12/2021 27.0 (H) 0.0 - 8.0 mg/L Final   04/11/2021 74.0 (H) 0.0 - 8.0 mg/L Final   04/10/2021 100.0 (H) 0.0 - 8.0 mg/L Final

## 2021-04-13 NOTE — DISCHARGE SUMMARY
[facility]  Hospitalist Discharge Summary      Date of Admission:  4/10/2021  Date of Discharge:  4/13/2021 12:17 PM  Discharging Provider: Mary Guidry MD      COVID-19 DIAGNOSIS DETAILS     Onset of COVID symptoms     Date of 1st positive test results     Current isolation precautions Special Precautions-COVID-19   COVID isolations end date  4/29     Discharge Diagnoses   Confirmed COVID-19 Infection  4/9/21    Follow-ups Needed After Discharge   Follow-up Appointments     Adult [facility] Follow-up and recommended labs and tests      Follow up with primary care provider, Physician No Ref-Primary, within   3-5 days for hospital follow- up.  The following labs/tests are   recommended: LFTs.      Appointments on [place] and/or [facility] (with [facility] or [facility]   provider or service). Call [phone] if you haven't heard regarding   these appointments within 7 days of discharge.             Unresulted Labs Ordered in the Past 30 Days of this Admission     No orders found from 3/11/2021 to 4/11/2021.      These results will be followed up by     Discharge Disposition   Discharged to home  Condition at discharge: Stable      Hospital Course       40 year old male with a past medical history significant for hypertension, depression admitted on 4/10/21 for shortness of breath, cough and diagnosed with COVID19 on April 9 ( mail-in test, result via email).       Acute Hypoxic respiratory Failure  COVID-19  Pneumonia  Elevated LFTs-trending down.     Symptoms started around March 29. CXR 4/10 showed hypoaeration, patchy infiltrates bilateral lungs that could represent COVID-19 or other atypical pneumonia.  D dimer was elevated. Was on oxygen supplementation at 2LPM on admission. Started on Remdesevir and dexamethasone on 4/10/21. Overall with improved symptoms. Now on RA at rest and at ambulation.  Stopped remdesevir prior to discharge  Complete dexamethasone for 10 days.  LFTs  elevated, hep panel negative,   discharge on eliquis for 30 days.  F/u LFTS in 3-5 days to be followed by PMD        Insomnia:   Resume home dose of Lunesta 3 mg and melatonin 20 mg      HTN:   Resume home dose of Lisinopril 10 mg, monitor     Depression:  Mood stable.  Resume home dose of Nortriptyline        Consultations This Hospital Stay   MEDICATION HISTORY IP PHARMACY CONSULT  INFECTIOUS DISEASE [facility] ADULT IP CONSULT  PHYSICAL THERAPY ADULT IP CONSULT    Code Status   Full Code    Time Spent on this Encounter   I, Mary Guidry MD, personally saw the patient today and spent greater than 30 minutes discharging this patient.  Bedside iPad: used for the ENTIRE encounter..       Mary Guidry MD  [facility] MED SURG ORTHOPEDIC  [address] [place] [id number]  Phone: [phone]  Fax: [phone]  ______________________________________________________________________  Discharge Medications   Discharge Medication List as of 4/13/2021 11:29 AM      START taking these medications    Details   apixaban ANTICOAGULANT (ELIQUIS) 2.5 MG tablet Take 1 tablet (2.5 mg) by mouth 2 times daily, Disp-60 tablet, R-0, Local Print      dexamethasone (DECADRON) 6 MG tablet Take 1 tablet (6 mg) by mouth daily for 6 days, Disp-6 tablet, R-0, Local Print         CONTINUE these medications which have NOT CHANGED    Details   benzonatate (TESSALON) 200 MG capsule Take 1 capsule (200 mg) by mouth 3 times daily as needed for cough, Disp-30 capsule, R-1, E-Prescribe      eszopiclone (LUNESTA) 3 MG tablet Take 1 tablet (3 mg) by mouth At Bedtime, Disp-30 tablet, R-1, E-Prescribe      fish oil-omega-3 fatty acids 1000 MG capsule Take 2 g by mouth daily, Historical      lisinopril (ZESTRIL) 10 MG tablet Take 10 mg by mouth daily, Historical      Melatonin 10 MG TABS tablet Take 20 mg by mouth nightly as needed for sleep, Historical      nortriptyline (PAMELOR) 50 MG capsule Take 3 capsules (150 mg) by mouth At  Bedtime, Disp-90 capsule, R-1, E-Prescribe      Vitamin D3 (CHOLECALCIFEROL) 125 MCG (5000 UT) tablet Take 1 tablet by mouth daily, Historical           Rationale for medication changes  None  Prescribed APIXABAN (Eliquis) 2.5 mg two times a day for discharge.  Discontinue after completing therapy for at least 30 days AND return to normal mobility    Physical Exam   Vital Signs: Temp: 95.9  F (35.5  C) Temp src: Oral BP: (!) 153/87 Pulse: 60   Resp: 18 SpO2: 96 % O2 Device: None (Room air)    Weight: 230 lbs 0 oz  Constitutional: awake, alert, cooperative, no apparent distress, and appears stated age  Respiratory: No increased work of breathing, good air exchange, clear to auscultation bilaterally, no crackles or wheezing  Cardiovascular: Normal apical impulse, regular rate and rhythm, normal S1 and S2, no S3 or S4, and no murmur noted  GI: No scars, normal bowel sounds, soft, non-distended, non-tender, no masses palpated, no hepatosplenomegally  Musculoskeletal: There is no redness, warmth, or swelling of the joints.  Full range of motion noted.  Motor strength is 5 out of 5 all extremities bilaterally.  Tone is normal.  Neurologic: Awake, alert, oriented to name, place and time.  Cranial nerves II-XII are grossly intact.  Motor is 5 out of 5 bilaterally.  Cerebellar finger to nose, heel to shin intact.  Sensory is intact.  Babinski down going, Romberg negative, and gait is normal.       Primary Care Physician   Physician No Ref-Primary    Discharge Orders      COVID-19 GetWell Loop Referral      Reason for your hospital stay    covid pneumonia     Contact provider    Contact your primary care provider if If increased trouble breathing, new arm/leg swelling, dizziness/passing out, falls, bleeding that doesn't stop, or uncontrolled pain.     Adult [facility] Follow-up and recommended labs and tests    Follow up with primary care provider, Physician No Ref-Primary, within 3-5 days for hospital follow- up.  The  following labs/tests are recommended: LFTs.      Appointments on [place] and/or [facility] (with [facility] or [facility] provider or service). Call [phone] if you haven't heard regarding these appointments within 7 days of discharge.     Discharge - Quarantine/Isolation Instruction    Date of symptom onset:     Date of first positive test:    Stay home and away from others (self-isolate) for at least 20 days from when your symptoms started And...   You've had no fevers and no medicine that reduces fever for 1 full day (24 hours)  And...   Your other symptoms have resolved (gotten better).     Activity    Your activity upon discharge: activity as tolerated     Diet    Follow this diet upon discharge: Orders Placed This Encounter      Combination Diet Regular Diet Adult       Significant Results and Procedures   COVID-19 Antibody Results, Testing for Immunity    COVID-19 Antibody Results, Testing for Immunity   No data to display.         COVID-19 PCR Results    COVID-19 PCR Results   No data to display.           Results for orders placed or performed during the hospital encounter of 04/10/21   XR Chest Port 1 View    Narrative    CHEST ONE VIEW PORTABLE  4/10/2021 4:25 PM     HISTORY: COVID positive.    COMPARISON: None.    FINDINGS:  The lungs are hypoaerated. There are subtle patchy  infiltrates bilateral lungs throughout the lungs. No pneumothorax or  significant pleural fluid collection. No acute fracture.      Impression    IMPRESSION:  1. Hypoaeration.  2. Patchy infiltrates bilateral lungs could represent COVID-19 or  other atypical pneumonia.    ALVARO OMALLEY MD       Allergies   No Known Allergies

## 2021-04-13 NOTE — PLAN OF CARE
VS: BP elevated - Scheduled BP medication given per MAR    O2: 02 test, pt spo2 was 94-95% at rest and 94-95% while ambulating pt did not require any oxygen    Output: Voiding spontaneously and hydrating well     Activity: Up ad akira in room    Up for meals? In chair    Skin: CDI    Pain: Pt denies pain    CMS: No new numbness or tingling    Dressing: N/a    Diet: Regular    LDA: IV to be removed before discharge    Equipment: IV pump and pole   Plan: Discharge to home to day    Additional Info: Special Precautions maintained

## 2021-04-13 NOTE — PLAN OF CARE
"  VS: BP (!) 143/85 (BP Location: Right arm)   Pulse 58   Temp 95.4  F (35.2  C) (Oral)   Resp 16   Wt 104.3 kg (230 lb)   SpO2 96%   BMI 31.19 kg/m       O2: 96% on Room air. Denies SOB or chest pain.    Output: Voids spontaneously without difficulty.    Last BM: Pt unable to recall specific day, stated \" It's been a few days now.\" Bowel sounds active in all quadrants. Pt denies pain in abdomen.    Activity: Up ad akira in room   Up for meals? None this shift   Skin: Pt refused skin assessment during night shift.    Pain: Denies   CMS: Intact. Pt denies any numbness or tingling.    Dressing: None   Diet: Regular   LDA: PIV Left hand SL   Equipment: IV pole/pump, pt belongings   Plan: Discharge home 4/13/21 before 11:00 am.    Additional Info:        "

## 2021-04-13 NOTE — PROGRESS NOTES
Patient has been assessed for Home Oxygen needs. Oxygen readings:    *Pulse oximetry (SpO2) = 94-96% on room air at rest while awake.    *SpO2 = 94-95% on room air during activity/with exercise.

## 2021-04-14 ENCOUNTER — PATIENT OUTREACH (OUTPATIENT)
Dept: CARE COORDINATION | Facility: CLINIC | Age: 41
End: 2021-04-14

## 2021-04-14 DIAGNOSIS — U07.1 2019 NOVEL CORONAVIRUS DISEASE (COVID-19): Primary | ICD-10-CM

## 2021-04-14 NOTE — PROGRESS NOTES
Clinic Care Coordination Contact  Community Health Worker Initial Outreach       The patient stated that he was doing well and that there were no questions or concerns at this time. The CHW informed him to follow up with a Primary Care Provider if there are any concerns.

## 2021-04-14 NOTE — PROGRESS NOTES
The patient was contacted by another CHW for post-hospital follow up. Will close encounter at this time.    Mita Kimball CMA, [facility]  Post Hospital Discharge Team

## 2021-05-24 DIAGNOSIS — F51.04 CHRONIC INSOMNIA: ICD-10-CM

## 2021-05-24 RX ORDER — ESZOPICLONE 3 MG/1
3 TABLET, FILM COATED ORAL AT BEDTIME
Qty: 30 TABLET | Refills: 1 | Status: SHIPPED | OUTPATIENT
Start: 2021-05-24 | End: 2021-07-20

## 2021-05-24 NOTE — TELEPHONE ENCOUNTER
Requested Prescriptions   Pending Prescriptions Disp Refills     eszopiclone (LUNESTA) 3 MG tablet 30 tablet 1     Sig: Take 1 tablet (3 mg) by mouth At Bedtime       There is no refill protocol information for this order        Routing refill request to provider for review/approval because:  Drug not on the [facility] refill protocol - patient reports he is out of medication -      Reports he made request last Thursday - discussed 72 hour refill policy - writer notes we also haven't received his request within the [facility] system - encouraged patient to contact pharmacy to see where they are sending their requests to

## 2021-06-04 ENCOUNTER — MYC MEDICAL ADVICE (OUTPATIENT)
Dept: FAMILY MEDICINE | Facility: CLINIC | Age: 41
End: 2021-06-04

## 2021-07-19 ENCOUNTER — MYC MEDICAL ADVICE (OUTPATIENT)
Dept: FAMILY MEDICINE | Facility: CLINIC | Age: 41
End: 2021-07-19

## 2021-07-20 DIAGNOSIS — F51.04 CHRONIC INSOMNIA: ICD-10-CM

## 2021-07-20 RX ORDER — IMIPRAMINE HCL 50 MG
50 TABLET ORAL DAILY
COMMUNITY
Start: 2021-07-13 | End: 2021-10-18

## 2021-07-20 RX ORDER — ESZOPICLONE 3 MG/1
3 TABLET, FILM COATED ORAL AT BEDTIME
Qty: 30 TABLET | Refills: 5 | Status: SHIPPED | OUTPATIENT
Start: 2021-07-20 | End: 2022-01-06

## 2021-07-20 NOTE — TELEPHONE ENCOUNTER
Routing refill request to provider for review/approval because:  Drug not on the [facility] refill protocol   Ana CLARKE RN

## 2021-07-22 DIAGNOSIS — F51.04 CHRONIC INSOMNIA: Primary | ICD-10-CM

## 2021-07-22 RX ORDER — ESZOPICLONE 3 MG/1
3 TABLET, FILM COATED ORAL AT BEDTIME
Qty: 1 TABLET | Refills: 0 | Status: SHIPPED | OUTPATIENT
Start: 2021-07-22 | End: 2022-01-06

## 2021-07-22 NOTE — TELEPHONE ENCOUNTER
Pt requesting for Rx Lunesta to go to a different pharmacy since [facility] [address] does not have in stock. Pharmacy is able to order in, but not until tomorrow.   Pt was not able to sleep last night without medication and would like to  from another pharmacy today.     [place] has this medication in stock.   Pended #30.   Did not want to cancel the long term script that was already sent to [facility].         Elizabet Freire RN   [facility]

## 2021-07-23 ENCOUNTER — MYC MEDICAL ADVICE (OUTPATIENT)
Dept: FAMILY MEDICINE | Facility: CLINIC | Age: 41
End: 2021-07-23

## 2021-10-11 ENCOUNTER — HEALTH MAINTENANCE LETTER (OUTPATIENT)
Age: 41
End: 2021-10-11

## 2021-10-18 ENCOUNTER — MYC MEDICAL ADVICE (OUTPATIENT)
Dept: FAMILY MEDICINE | Facility: CLINIC | Age: 41
End: 2021-10-18

## 2021-10-18 DIAGNOSIS — F51.04 CHRONIC INSOMNIA: Primary | ICD-10-CM

## 2021-10-18 RX ORDER — IMIPRAMINE HCL 50 MG
150 TABLET ORAL DAILY
Qty: 90 TABLET | Refills: 1 | Status: SHIPPED | OUTPATIENT
Start: 2021-10-18 | End: 2022-03-01

## 2021-10-18 NOTE — TELEPHONE ENCOUNTER
Dr Sanchez    See pt MyChart message    Med is cued if you agree    Kyra Russ RN   [facility]

## 2021-10-28 NOTE — PROGRESS NOTES
Group Topic: BH Group Psychotherapy    Date: 10/28/2021  Start Time: 0930  End Time: 1030  Facilitators: Cynthia Camara LPC    Focus:  Process  Number in attendance: 9  Pts reviewed daily progress in tx discussing present mood and safety concerns.  Time was spent reflecting on the reason for admission as well as personal daily goals.  Support from peers was encouraged.    Method: Group  Attendance: Present  Participation: Active  Patient Response: Appropriate feedback  Mood: Normal  Affect: Type: Euthymic (normal mood)   Range: Full (normal)   Congruency: Congruent   Stability: Stable  Behavior/Socialization: Appropriate to group  Thought Process: Demonstrated insight  Task Performance: Follows directions  Psychosocial Stressors: Interpersonal conflict  Patient Evaluation: Independent - full participation   Pt reflected on relationships with others at school.  Friends come to pt for help with their problems but do not reciprocate when pt wants to talk about her concerns.  Pt stated she is a people pleaser and discussed negatives of being in that role.  Pt will use time in hospital to focus on her emotional needs.       SPIRITUAL HEALTH SERVICES: Tele-Encounter  Patient Location (Hospital, [facility], Unit): [facility], [facility], [address] [facility]  Spoke with (patient, family relationship): Patient      Referral Source: Patient request    If applicable: patient was appropriately screened for telechaplaincy support with bedside nurse prior to visit (e.g. Mental Health and Addiction contexts). See call details below.    DATA:  I introduced SHS to patient and assessed for needs.  Patient requested Eucharist.  I informed patient that unfortunately, due to precautions and Covid-19 restrictions it is not possible to distribute communion at this time. He understands and has no other needs at this time.        PLAN:  No follow-up planned.  SHS remains available for the duration of patient's hospitalization.     Chaplain Loida Dawn    ______________________________    Type of service:  Telephone Visit     has received verbal consent for a TelephoneVisit from the patient? Yes    Distance Provider Location: designated [place] office or home office (secure setting)    Mode of Communication: telephone (via [employer] phone or [employer] tele-call-number ([phone]))

## 2022-01-06 ENCOUNTER — MYC REFILL (OUTPATIENT)
Dept: FAMILY MEDICINE | Facility: CLINIC | Age: 42
End: 2022-01-06
Payer: COMMERCIAL

## 2022-01-06 DIAGNOSIS — I10 ESSENTIAL HYPERTENSION: Primary | ICD-10-CM

## 2022-01-06 DIAGNOSIS — F51.04 CHRONIC INSOMNIA: ICD-10-CM

## 2022-01-06 RX ORDER — ESZOPICLONE 3 MG/1
3 TABLET, FILM COATED ORAL AT BEDTIME
Qty: 30 TABLET | Refills: 5 | Status: SHIPPED | OUTPATIENT
Start: 2022-01-06 | End: 2022-01-17

## 2022-01-06 RX ORDER — ESZOPICLONE 3 MG/1
3 TABLET, FILM COATED ORAL AT BEDTIME
Qty: 1 TABLET | Refills: 0 | Status: CANCELLED | OUTPATIENT
Start: 2022-01-06

## 2022-01-06 RX ORDER — LISINOPRIL 10 MG/1
10 TABLET ORAL DAILY
Qty: 30 TABLET | Refills: 2 | Status: SHIPPED | OUTPATIENT
Start: 2022-01-06 | End: 2022-06-20

## 2022-01-06 NOTE — TELEPHONE ENCOUNTER
Requested Prescriptions   Pending Prescriptions Disp Refills     eszopiclone (LUNESTA) 3 MG tablet 1 tablet 0     Sig: Take 1 tablet (3 mg) by mouth At Bedtime       There is no refill protocol information for this order        Routing refill request to provider for review/approval because:  Drug not on the [facility] refill protocol     Heather Stanley RN  [facility]

## 2022-01-06 NOTE — TELEPHONE ENCOUNTER
"Genia humphrey, pt has upcoming appt.     Requested Prescriptions   Signed Prescriptions Disp Refills    lisinopril (ZESTRIL) 10 MG tablet 30 tablet 2     Sig: Take 1 tablet (10 mg) by mouth daily       ACE Inhibitors (Including Combos) Protocol Failed - 1/6/2022  4:54 PM        Failed - Blood pressure under 140/90 in past 12 months     BP Readings from Last 3 Encounters:   04/13/21 (!) 153/87   01/11/21 132/88                 Passed - Recent (12 mo) or future (30 days) visit within the authorizing provider's specialty     Patient has had an office visit with the authorizing provider or a provider within the authorizing providers department within the previous 12 mos or has a future within next 30 days. See \"Patient Info\" tab in inbasket, or \"Choose Columns\" in Meds & Orders section of the refill encounter.              Passed - Medication is active on med list        Passed - Patient is age 18 or older        Passed - Normal serum creatinine on file in past 12 months     Recent Labs   Lab Test 04/13/21  0824   CR 0.70       Ok to refill medication if creatinine is low          Passed - Normal serum potassium on file in past 12 months     Recent Labs   Lab Test 04/13/21  0824   POTASSIUM 3.4                ThanksHeather RN  [facility]     "

## 2022-01-17 DIAGNOSIS — F51.04 CHRONIC INSOMNIA: ICD-10-CM

## 2022-01-17 RX ORDER — ESZOPICLONE 3 MG/1
3 TABLET, FILM COATED ORAL AT BEDTIME
Qty: 30 TABLET | Refills: 0 | Status: SHIPPED | OUTPATIENT
Start: 2022-01-17 | End: 2022-02-13

## 2022-01-17 NOTE — TELEPHONE ENCOUNTER
30 day supply sent to requested pharmacy in [place].    Please call patient to let him know.    Bertrand Sanchez MD

## 2022-01-17 NOTE — TELEPHONE ENCOUNTER
Spoke with pt and notified, verbalized understanding.     Thanks,  JAMARCUS Romero  [facility]

## 2022-01-17 NOTE — TELEPHONE ENCOUNTER
Reason for Call:  Medication or medication refill:    Do you use a [facility]?  Name of the pharmacy and phone number for the current request:    [facility] at [address] [place] [id number]  [phone]    Name of the medication requested:   eszopiclone (LUNESTA) 3 MG tablet    Other request: [place] is out and [place]    Can we leave a detailed message on this number? YES    Phone number patient can be reached at: Home number on file [phone] (home)    Best Time: Anytime    Call taken on 1/17/2022 at 8:58 AM by Jessica Wilks MA

## 2022-01-17 NOTE — TELEPHONE ENCOUNTER
Requested Prescriptions   Pending Prescriptions Disp Refills     eszopiclone (LUNESTA) 3 MG tablet 30 tablet 5     Sig: Take 1 tablet (3 mg) by mouth At Bedtime       There is no refill protocol information for this order        Routing refill request to provider for review/approval because:  Drug not on the [facility] refill protocol     Heather Stanley RN  [facility]

## 2022-02-02 ENCOUNTER — MYC MEDICAL ADVICE (OUTPATIENT)
Dept: FAMILY MEDICINE | Facility: CLINIC | Age: 42
End: 2022-02-02
Payer: COMMERCIAL

## 2022-02-02 DIAGNOSIS — I10 ESSENTIAL HYPERTENSION: Primary | ICD-10-CM

## 2022-02-15 ENCOUNTER — MYC REFILL (OUTPATIENT)
Dept: FAMILY MEDICINE | Facility: CLINIC | Age: 42
End: 2022-02-15
Payer: COMMERCIAL

## 2022-02-15 ENCOUNTER — MYC MEDICAL ADVICE (OUTPATIENT)
Dept: FAMILY MEDICINE | Facility: CLINIC | Age: 42
End: 2022-02-15
Payer: COMMERCIAL

## 2022-02-15 DIAGNOSIS — F51.04 CHRONIC INSOMNIA: ICD-10-CM

## 2022-02-15 RX ORDER — ESZOPICLONE 3 MG/1
3 TABLET, FILM COATED ORAL AT BEDTIME
Qty: 30 TABLET | Refills: 0 | Status: CANCELLED | OUTPATIENT
Start: 2022-02-15

## 2022-02-23 ENCOUNTER — LAB (OUTPATIENT)
Dept: LAB | Facility: CLINIC | Age: 42
End: 2022-02-23
Payer: COMMERCIAL

## 2022-02-23 DIAGNOSIS — I10 ESSENTIAL HYPERTENSION: ICD-10-CM

## 2022-02-23 LAB
ALBUMIN SERPL-MCNC: 4.3 G/DL (ref 3.4–5)
ALP SERPL-CCNC: 68 U/L (ref 40–150)
ALT SERPL W P-5'-P-CCNC: 48 U/L (ref 0–70)
ANION GAP SERPL CALCULATED.3IONS-SCNC: 6 MMOL/L (ref 3–14)
AST SERPL W P-5'-P-CCNC: 22 U/L (ref 0–45)
BILIRUB SERPL-MCNC: 0.8 MG/DL (ref 0.2–1.3)
BUN SERPL-MCNC: 11 MG/DL (ref 7–30)
CALCIUM SERPL-MCNC: 9.8 MG/DL (ref 8.5–10.1)
CHLORIDE BLD-SCNC: 105 MMOL/L (ref 94–109)
CHOLEST SERPL-MCNC: 187 MG/DL
CO2 SERPL-SCNC: 27 MMOL/L (ref 20–32)
CREAT SERPL-MCNC: 0.79 MG/DL (ref 0.52–1.25)
ERYTHROCYTE [DISTWIDTH] IN BLOOD BY AUTOMATED COUNT: 15 % (ref 10–15)
FASTING STATUS PATIENT QL REPORTED: YES
GFR SERPL CREATININE-BSD FRML MDRD: >90 ML/MIN/1.73M2
GLUCOSE BLD-MCNC: 95 MG/DL (ref 70–99)
HCT VFR BLD AUTO: 46.4 % (ref 35–53)
HDLC SERPL-MCNC: 56 MG/DL
HGB BLD-MCNC: 15.7 G/DL (ref 11.7–17.7)
LDLC SERPL CALC-MCNC: 114 MG/DL
MCH RBC QN AUTO: 27.6 PG (ref 26.5–33)
MCHC RBC AUTO-ENTMCNC: 33.8 G/DL (ref 31.5–36.5)
MCV RBC AUTO: 82 FL (ref 78–100)
NONHDLC SERPL-MCNC: 131 MG/DL
PLATELET # BLD AUTO: 251 10E3/UL (ref 150–450)
POTASSIUM BLD-SCNC: 4.3 MMOL/L (ref 3.4–5.3)
PROT SERPL-MCNC: 7.8 G/DL (ref 6.8–8.8)
RBC # BLD AUTO: 5.69 10E6/UL (ref 3.8–5.9)
SODIUM SERPL-SCNC: 138 MMOL/L (ref 133–144)
TRIGL SERPL-MCNC: 84 MG/DL
WBC # BLD AUTO: 5.8 10E3/UL (ref 4–11)

## 2022-02-23 PROCEDURE — 80053 COMPREHEN METABOLIC PANEL: CPT

## 2022-02-23 PROCEDURE — 80061 LIPID PANEL: CPT

## 2022-02-23 PROCEDURE — 36415 COLL VENOUS BLD VENIPUNCTURE: CPT

## 2022-02-23 PROCEDURE — 85027 COMPLETE CBC AUTOMATED: CPT

## 2022-03-01 ENCOUNTER — OFFICE VISIT (OUTPATIENT)
Dept: FAMILY MEDICINE | Facility: CLINIC | Age: 42
End: 2022-03-01
Payer: COMMERCIAL

## 2022-03-01 VITALS
BODY MASS INDEX: 26.01 KG/M2 | SYSTOLIC BLOOD PRESSURE: 134 MMHG | OXYGEN SATURATION: 100 % | DIASTOLIC BLOOD PRESSURE: 82 MMHG | HEART RATE: 109 BPM | TEMPERATURE: 97.5 F | HEIGHT: 72 IN | WEIGHT: 192 LBS

## 2022-03-01 DIAGNOSIS — Z00.00 ROUTINE GENERAL MEDICAL EXAMINATION AT A HEALTH CARE FACILITY: Primary | ICD-10-CM

## 2022-03-01 DIAGNOSIS — F51.04 CHRONIC INSOMNIA: ICD-10-CM

## 2022-03-01 DIAGNOSIS — I10 ESSENTIAL HYPERTENSION: ICD-10-CM

## 2022-03-01 PROCEDURE — 99396 PREV VISIT EST AGE 40-64: CPT | Performed by: FAMILY MEDICINE

## 2022-03-01 NOTE — PROGRESS NOTES
SUBJECTIVE:   CC: Roger Law is an 41 year old male who presents for preventative health visit.       Patient has been advised of split billing requirements and indicates understanding: Yes  Healthy Habits:     Getting at least 3 servings of Calcium per day:  Yes    Bi-annual eye exam:  NO    Dental care twice a year:  NO    Sleep apnea or symptoms of sleep apnea:  Sleep apnea    Diet:  Regular (no restrictions)    Frequency of exercise:  4-5 days/week    Duration of exercise:  45-60 minutes    Taking medications regularly:  No    Barriers to taking medications:  None    Medication side effects:  Not applicable    PHQ-2 Total Score: 0    Additional concerns today:  No          Patient is here today for annual checkup.  He had blood work completed prior to the visit.  He has had an intentional weight loss of about 40 pounds over the last year and would like to lose about 10 more pounds.  He asked about cutting back on his lisinopril as blood pressures at home are typically 120s/70s.  No particular side effects related to it.  Last time we met he was studying to enter the [employer] but he has diverted to a different path at this time and is now working as a Taoist-based psychotherapist which she finds rewarding at this time.  No other concerns were expressed by the patient.    Today's PHQ-2 Score:   PHQ-2 ( 1999 Pfizer) 2/24/2022   Q1: Little interest or pleasure in doing things 0   Q2: Feeling down, depressed or hopeless 0   PHQ-2 Score 0   PHQ-2 Total Score (12-17 Years)- Positive if 3 or more points; Administer PHQ-A if positive -   Q1: Little interest or pleasure in doing things Not at all   Q2: Feeling down, depressed or hopeless Not at all   PHQ-2 Score 0       Abuse: Current or Past(Physical, Sexual or Emotional)- No  Do you feel safe in your environment? Yes    Have you ever done Advance Care Planning? (For example, a Health Directive, POLST, or a discussion with a medical provider or your loved ones  about your wishes): No, advance care planning information given to patient to review.  Patient declined advance care planning discussion at this time.    Social History     Tobacco Use     Smoking status: Former Smoker     Packs/day: 0.50     Years: 10.00     Pack years: 5.00     Quit date: 2006     Years since quittin.1     Smokeless tobacco: Never Used   Substance Use Topics     Alcohol use: Yes     Comment: 0-few drinks per month         Alcohol Use 2022   Prescreen: >3 drinks/day or >7 drinks/week? No       Last PSA: No results found for: PSA    Reviewed orders with patient. Reviewed health maintenance and updated orders accordingly - Yes  Patient Active Problem List   Diagnosis     Chronic insomnia     Essential hypertension     Pneumonia due to 2019 novel coronavirus     Past Surgical History:   Procedure Laterality Date     NO HISTORY OF SURGERY         Social History     Tobacco Use     Smoking status: Former Smoker     Packs/day: 0.50     Years: 10.00     Pack years: 5.00     Quit date: 2006     Years since quittin.1     Smokeless tobacco: Never Used   Substance Use Topics     Alcohol use: Yes     Comment: 0-few drinks per month     Family History   Problem Relation Age of Onset     Hypertension Father      Depression Sister      Depression Sister          Current Outpatient Medications   Medication Sig Dispense Refill     eszopiclone (LUNESTA) 3 MG tablet Take 1 tablet (3 mg) by mouth At Bedtime 30 tablet 5     fish oil-omega-3 fatty acids 1000 MG capsule Take 2 g by mouth daily       lisinopril (ZESTRIL) 10 MG tablet Take 1 tablet (10 mg) by mouth daily 30 tablet 2     Vitamin D3 (CHOLECALCIFEROL) 125 MCG (5000 UT) tablet Take 1 tablet by mouth daily       No Known Allergies    Reviewed and updated as needed this visit by clinical staff   Tobacco  Allergies  Meds  Problems  Med Hx  Surg Hx  Fam Hx  Soc   Hx        Reviewed and updated as needed this visit by Provider    Tobacco  Allergies  Meds  Problems  Med Hx  Surg Hx  Fam Hx  Soc   Hx           Review of Systems  CONSTITUTIONAL: NEGATIVE for fever, chills, change in weight  INTEGUMENTARY/SKIN: NEGATIVE for worrisome rashes, moles or lesions  EYES: NEGATIVE for vision changes or irritation  ENT: NEGATIVE for ear, mouth and throat problems  RESP: NEGATIVE for significant cough or SOB  CV: NEGATIVE for chest pain, palpitations or peripheral edema  GI: NEGATIVE for nausea, abdominal pain, heartburn, or change in bowel habits   male: negative for dysuria, hematuria, decreased urinary stream, erectile dysfunction, urethral discharge  MUSCULOSKELETAL: NEGATIVE for significant arthralgias or myalgia  NEURO: NEGATIVE for weakness, dizziness or paresthesias  ENDOCRINE: NEGATIVE for temperature intolerance, skin/hair changes  PSYCHIATRIC: NEGATIVE for changes in mood or affect    OBJECTIVE:   BP (!) 147/97 (BP Location: Right arm, Patient Position: Sitting, Cuff Size: Adult Regular)   Pulse 109   Temp 97.5  F (36.4  C) (Temporal)   Ht 1.829 m (6')   Wt 87.1 kg (192 lb)   SpO2 100%   BMI 26.04 kg/m      Physical Exam  GENERAL: healthy, alert and no distress  EYES: Eyes grossly normal to inspection, PERRL and conjunctivae and sclerae normal  HENT: ear canals and TM's normal, nose and mouth without ulcers or lesions  NECK: no adenopathy, no asymmetry, masses, or scars and thyroid normal to palpation  RESP: lungs clear to auscultation - no rales, rhonchi or wheezes  CV: regular rate and rhythm, normal S1 S2, no S3 or S4, no murmur, click or rub, no peripheral edema and peripheral pulses strong  ABDOMEN: soft, nontender, no hepatosplenomegaly, no masses and bowel sounds normal   (male): normal male genitalia without lesions or urethral discharge, no hernia  MS: no gross musculoskeletal defects noted, no edema  SKIN: no suspicious lesions or rashes  NEURO: Normal strength and tone, mentation intact and speech normal  PSYCH:  mentation appears normal, affect normal/bright    Diagnostic Test Results:  Labs reviewed in Epic  Last Comprehensive Metabolic Panel:  Sodium   Date Value Ref Range Status   02/23/2022 138 133 - 144 mmol/L Final   04/13/2021 141 133 - 144 mmol/L Final     Potassium   Date Value Ref Range Status   02/23/2022 4.3 3.4 - 5.3 mmol/L Final   04/13/2021 3.4 3.4 - 5.3 mmol/L Final     Chloride   Date Value Ref Range Status   02/23/2022 105 94 - 109 mmol/L Final     Comment:     0-20 years:       Female:  mmol/L  Male:    mmol/L       20 years and older:   Female:  mmol/L   Male:    mmol/L     04/13/2021 111 (H) 94 - 109 mmol/L Final     Carbon Dioxide   Date Value Ref Range Status   04/13/2021 24 20 - 32 mmol/L Final     Carbon Dioxide (CO2)   Date Value Ref Range Status   02/23/2022 27 20 - 32 mmol/L Final     Anion Gap   Date Value Ref Range Status   02/23/2022 6 3 - 14 mmol/L Final   04/13/2021 6 3 - 14 mmol/L Final     Glucose   Date Value Ref Range Status   02/23/2022 95 70 - 99 mg/dL Final   04/13/2021 114 (H) 70 - 99 mg/dL Final     Urea Nitrogen   Date Value Ref Range Status   02/23/2022 11 7 - 30 mg/dL Final     Comment:     Female  0 to 15 days           3-23  15 days to 1 year      3-17  1 to 10 years          9-22  10 to 19 years         7-19  19 years and older     7-30    Male  0 to 15 days           3-23  15 days to 1 year      3-17  1 to 10 years          9-22  10 to 19 years         7-21  19 years and older     7-30   04/13/2021 14 7 - 30 mg/dL Final     Creatinine   Date Value Ref Range Status   02/23/2022 0.79 0.52 - 1.25 mg/dL Final     Comment:     20 y and older Female0.52-1.04 mg/dL  20 y and older Male0.66-1.25 mg/dL    Varies with the amount of muscle mass present.    GICH  Female: 0.6-1.2 mg/dL  Male: 0.7-1.3 mg/dL     04/13/2021 0.70 0.66 - 1.25 mg/dL Final     GFR Estimate   Date Value Ref Range Status   02/23/2022 >90 >60 mL/min/1.73m2 Final     Comment:     GFR not  calculated when sex unspecified or nonbinary.  Effective December 21, 2021 eGFRcr in adults is calculated using the 2021 CKD-EPI creatinine equation which includes age and gender (Carmella et al., NEJ, DOI: 10.1056/SWTPvm8951375)   04/13/2021 >90 >60 mL/min/[1.73_m2] Final     Comment:     Non  GFR Calc  Starting 12/18/2018, serum creatinine based estimated GFR (eGFR) will be   calculated using the Chronic Kidney Disease Epidemiology Collaboration   (CKD-EPI) equation.       Calcium   Date Value Ref Range Status   02/23/2022 9.8 8.5 - 10.1 mg/dL Final   04/13/2021 8.0 (L) 8.5 - 10.1 mg/dL Final     Recent Labs   Lab Test 02/23/22  1340 01/11/21  1012   CHOL 187 288*   HDL 56 48   * 218*   TRIG 84 109         ASSESSMENT/PLAN:   (Z00.00) Routine general medical examination at a health care facility  (primary encounter diagnosis)  Comment: Routine health issues appropriate for age reviewed.  Follow-up is recommended in 1 year.  Covid and flu vaccines were discussed but declined today.  Plan: REVIEW OF HEALTH MAINTENANCE PROTOCOL ORDERS             (I10) Essential hypertension  Comment: Under control at this time.  He may consider cutting down on the lisinopril to 5 mg daily and continue to monitor his blood pressure if he does so.  Refills can be provided when needed.  Plan:      (F51.04) Chronic insomnia  Comment: Reasonable success with the Lunesta.  Refills were provided last month and can be provided again when due in about 6 months.  Plan:      Patient has been advised of split billing requirements and indicates understanding: Yes    COUNSELING:   Reviewed preventive health counseling, as reflected in patient instructions       Regular exercise       Healthy diet/nutrition       Vision screening       Hearing screening       Immunizations    Declined: Influenza due to Concerns about side effects/safety               Colorectal cancer screening       Prostate cancer screening    Estimated body  mass index is 26.04 kg/m  as calculated from the following:    Height as of this encounter: 1.829 m (6').    Weight as of this encounter: 87.1 kg (192 lb).         Kiran Law reports that he quit smoking about 16 years ago. He has a 5.00 pack-year smoking history. He has never used smokeless tobacco.      Counseling Resources:  ATP IV Guidelines  Pooled Cohorts Equation Calculator  FRAX Risk Assessment  ICSI Preventive Guidelines  Dietary Guidelines for Americans, 2010  USDA's MyPlate  ASA Prophylaxis  Lung CA Screening    Bertrand Sanchez MD  [facility]

## 2022-06-13 ENCOUNTER — MYC MEDICAL ADVICE (OUTPATIENT)
Dept: FAMILY MEDICINE | Facility: CLINIC | Age: 42
End: 2022-06-13
Payer: COMMERCIAL

## 2022-06-13 DIAGNOSIS — M25.539 PAIN IN WRIST, UNSPECIFIED LATERALITY: Primary | ICD-10-CM

## 2022-06-16 ENCOUNTER — MYC MEDICAL ADVICE (OUTPATIENT)
Dept: FAMILY MEDICINE | Facility: CLINIC | Age: 42
End: 2022-06-16

## 2022-06-16 NOTE — LETTER
[facility]  [address] [place] [address] [address]  [address]  [facility] [address]  Phone: [phone]  Fax: [phone]    06/20/22    Roger Law  [address]   [place] [address]      To whom it may concern:        1. Lisinopril has been discontinued since March 2021.  2. Hypertension has been resolved since March 2021.  3. You have received NO treatment for Hypertension since March 2021.  4. You have no prescribed mediations for Hypertension since March 2021.  You take no supplements or over-the-counter medications for Hypertension since March 2021.         Sincerely,            Bertrand Sanchez MD

## 2022-06-20 NOTE — TELEPHONE ENCOUNTER
Dr. Sanchez,      Please see patients Mychart message     Letter cued for your review/revision/signature. Will need to be faxed to:   Azul RICKETTS   Medical    at [facility]       [email or web address]      Fax - [phone]       Phone - [phone]    Thank you,   Esperanza Rojas RN  [facility]

## 2022-06-27 ENCOUNTER — OFFICE VISIT (OUTPATIENT)
Dept: ORTHOPEDICS | Facility: CLINIC | Age: 42
End: 2022-06-27
Attending: FAMILY MEDICINE
Payer: COMMERCIAL

## 2022-06-27 VITALS
WEIGHT: 192 LBS | SYSTOLIC BLOOD PRESSURE: 138 MMHG | DIASTOLIC BLOOD PRESSURE: 88 MMHG | RESPIRATION RATE: 18 BRPM | HEIGHT: 72 IN | HEART RATE: 90 BPM | BODY MASS INDEX: 26.01 KG/M2

## 2022-06-27 DIAGNOSIS — M25.539 PAIN IN WRIST, UNSPECIFIED LATERALITY: ICD-10-CM

## 2022-06-27 DIAGNOSIS — G56.23 CUBITAL TUNNEL SYNDROME OF BOTH UPPER EXTREMITIES: Primary | ICD-10-CM

## 2022-06-27 PROBLEM — G56.21 CUBITAL TUNNEL SYNDROME ON RIGHT: Status: ACTIVE | Noted: 2022-06-27

## 2022-06-27 PROCEDURE — 99203 OFFICE O/P NEW LOW 30 MIN: CPT | Performed by: ORTHOPAEDIC SURGERY

## 2022-06-27 NOTE — PATIENT INSTRUCTIONS
Diagnosis:  cubital tunnel syndrome.  Avoid full bend of elbow, especially at night or on phone.  Avoid leaning on elbows.  We can consider night elbow extension splint.

## 2022-06-27 NOTE — LETTER
2022         RE: Roger Law  [address]  [place] [address]        Dear Colleague,    Thank you for referring your patient, Roger Law, to the [facility]. Please see a copy of my visit note below.    Roger Law is a 41 year old male who is seen in consultation at the request of Dr. Bertrand Sanchez  for bilateral hand numbness and pain.  He has had this for about 7 to 8 months.  He has constant pain rated 7 out of 10 primarily in the small and ring fingers.  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  He also tends to sleep with his elbows bent.  He is used ice on the hands with some relief.  He has used Tylenol.        Past Medical History:   Diagnosis Date     Closed fracture of left tibia and fibula with routine healing      Depressive disorder      Hypertension      Pneumonia due to 2019 novel coronavirus 2021       Past Surgical History:   Procedure Laterality Date     NO HISTORY OF SURGERY         Family History   Problem Relation Age of Onset     Hypertension Father      Depression Sister      Depression Sister        Social History     Socioeconomic History     Marital status: Single     Spouse name: Not on file     Number of children: 0     Years of education: Not on file     Highest education level: Not on file   Occupational History     Occupation: Psychotherapist     Comment: [employer]   Tobacco Use     Smoking status: Former Smoker     Packs/day: 0.50     Years: 10.00     Pack years: 5.00     Quit date: 2006     Years since quittin.4     Smokeless tobacco: Never Used   Vaping Use     Vaping Use: Never used   Substance and Sexual Activity     Alcohol use: Yes     Comment: 0-few drinks per month     Drug use: Never     Sexual activity: Not Currently     Partners: Female   Other Topics Concern     Not on file   Social History Narrative    Living alone.  No pets.     Social Determinants of  Health     Financial Resource Strain: Not on file   Food Insecurity: Not on file   Transportation Needs: Not on file   Physical Activity: Not on file   Stress: Not on file   Social Connections: Not on file   Intimate Partner Violence: Not on file   Housing Stability: Not on file       Current Outpatient Medications   Medication Sig Dispense Refill     eszopiclone (LUNESTA) 3 MG tablet Take 1 tablet (3 mg) by mouth At Bedtime 30 tablet 5     fish oil-omega-3 fatty acids 1000 MG capsule Take 2 g by mouth daily       Vitamin D3 (CHOLECALCIFEROL) 125 MCG (5000 UT) tablet Take 1 tablet by mouth daily         No Known Allergies    REVIEW OF SYSTEMS:  CONSTITUTIONAL:  NEGATIVE for fever, chills, change in weight, not feeling tired  SKIN:  NEGATIVE for worrisome rashes, no skin lumps, no skin ulcers and no non-healing wounds  EYES:  NEGATIVE for vision changes or irritation.  ENT/MOUTH:  NEGATIVE.  No hearing loss, no hoarseness, no difficulty swallowing.  RESP:  NEGATIVE. No cough or shortness of breath.  CV:  NEGATIVE for chest pain, palpitations or peripheral edema  GI:  NEGATIVE for nausea, abdominal pain, heartburn, or change in bowel habits  :  Negative. No dysuria, no hematuria  MUSCULOSKELETAL:  See HPI above  NEURO:  NEGATIVE . No headaches, no dizziness,  no numbness  ENDOCRINE:  NEGATIVE for temperature intolerance, skin/hair changes  HEME/ALLERGY/IMMUNE:  NEGATIVE for bleeding problems  PSYCHIATRIC:  NEGATIVE. no anxiety, no depression.     Exam:  Vitals: /88   Pulse 90   Resp 18   Ht 1.829 m (6')   Wt 87.1 kg (192 lb)   BMI 26.04 kg/m    BMI= Body mass index is 26.04 kg/m .  Constitutional:  healthy, alert and no distress  Neuro: Alert and Oriented x 3, no focal defects.  Psych: Affect normal   Respiratory: Breathing not labored.  Cardiovascular: normal peripheral pulses  Lymph: no adenopathy  Skin: No rashes,worrisome lesions or skin problems  Is full range of motion of the neck without pains.  He  does have positive Tinel over the ulnar nerve at both elbows at the cubital tunnels.  He has no subluxation of the ulnar nerves.  He has full range of motion of the wrist with negative Tinel over the median, ulnar, and radial nerves.  He has some tingling in the small and ring fingers bilaterally.  He has full range of motion of the wrist.  There is no triggering.  Strength is intact.    Assessment:  Bilateral cubital tunnel syndrome.  Plan: He should avoid leaning on the elbows and avoid prolonged bending such as sleeping or being on the phone.  If he fails this approach it can take up to 6 months to resolve.  If he cannot keep the elbows straighter at night, we can refer to therapy to make extension braces.        Again, thank you for allowing me to participate in the care of your patient.        Sincerely,        Andrés Newman MD

## 2022-06-28 NOTE — PROGRESS NOTES
Roger Law is a 41 year old male who is seen in consultation at the request of Dr. Bertrand Sanchez  for bilateral hand numbness and pain.  He has had this for about 7 to 8 months.  He has constant pain rated 7 out of 10 primarily in the small and ring fingers.  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  He also tends to sleep with his elbows bent.  He is used ice on the hands with some relief.  He has used Tylenol.        Past Medical History:   Diagnosis Date     Closed fracture of left tibia and fibula with routine healing      Depressive disorder      Hypertension      Pneumonia due to 2019 novel coronavirus 2021       Past Surgical History:   Procedure Laterality Date     NO HISTORY OF SURGERY         Family History   Problem Relation Age of Onset     Hypertension Father      Depression Sister      Depression Sister        Social History     Socioeconomic History     Marital status: Single     Spouse name: Not on file     Number of children: 0     Years of education: Not on file     Highest education level: Not on file   Occupational History     Occupation: Psychotherapist     Comment: [employer]   Tobacco Use     Smoking status: Former Smoker     Packs/day: 0.50     Years: 10.00     Pack years: 5.00     Quit date: 2006     Years since quittin.4     Smokeless tobacco: Never Used   Vaping Use     Vaping Use: Never used   Substance and Sexual Activity     Alcohol use: Yes     Comment: 0-few drinks per month     Drug use: Never     Sexual activity: Not Currently     Partners: Female   Other Topics Concern     Not on file   Social History Narrative    Living alone.  No pets.     Social Determinants of Health     Financial Resource Strain: Not on file   Food Insecurity: Not on file   Transportation Needs: Not on file   Physical Activity: Not on file   Stress: Not on file   Social Connections: Not on file   Intimate Partner Violence: Not on file   Housing  Stability: Not on file       Current Outpatient Medications   Medication Sig Dispense Refill     eszopiclone (LUNESTA) 3 MG tablet Take 1 tablet (3 mg) by mouth At Bedtime 30 tablet 5     fish oil-omega-3 fatty acids 1000 MG capsule Take 2 g by mouth daily       Vitamin D3 (CHOLECALCIFEROL) 125 MCG (5000 UT) tablet Take 1 tablet by mouth daily         No Known Allergies    REVIEW OF SYSTEMS:  CONSTITUTIONAL:  NEGATIVE for fever, chills, change in weight, not feeling tired  SKIN:  NEGATIVE for worrisome rashes, no skin lumps, no skin ulcers and no non-healing wounds  EYES:  NEGATIVE for vision changes or irritation.  ENT/MOUTH:  NEGATIVE.  No hearing loss, no hoarseness, no difficulty swallowing.  RESP:  NEGATIVE. No cough or shortness of breath.  CV:  NEGATIVE for chest pain, palpitations or peripheral edema  GI:  NEGATIVE for nausea, abdominal pain, heartburn, or change in bowel habits  :  Negative. No dysuria, no hematuria  MUSCULOSKELETAL:  See HPI above  NEURO:  NEGATIVE . No headaches, no dizziness,  no numbness  ENDOCRINE:  NEGATIVE for temperature intolerance, skin/hair changes  HEME/ALLERGY/IMMUNE:  NEGATIVE for bleeding problems  PSYCHIATRIC:  NEGATIVE. no anxiety, no depression.     Exam:  Vitals: /88   Pulse 90   Resp 18   Ht 1.829 m (6')   Wt 87.1 kg (192 lb)   BMI 26.04 kg/m    BMI= Body mass index is 26.04 kg/m .  Constitutional:  healthy, alert and no distress  Neuro: Alert and Oriented x 3, no focal defects.  Psych: Affect normal   Respiratory: Breathing not labored.  Cardiovascular: normal peripheral pulses  Lymph: no adenopathy  Skin: No rashes,worrisome lesions or skin problems  Is full range of motion of the neck without pains.  He does have positive Tinel over the ulnar nerve at both elbows at the cubital tunnels.  He has no subluxation of the ulnar nerves.  He has full range of motion of the wrist with negative Tinel over the median, ulnar, and radial nerves.  He has some tingling  in the small and ring fingers bilaterally.  He has full range of motion of the wrist.  There is no triggering.  Strength is intact.    Assessment:  Bilateral cubital tunnel syndrome.  Plan: He should avoid leaning on the elbows and avoid prolonged bending such as sleeping or being on the phone.  If he fails this approach it can take up to 6 months to resolve.  If he cannot keep the elbows straighter at night, we can refer to therapy to make extension braces.

## 2022-08-15 DIAGNOSIS — F51.04 CHRONIC INSOMNIA: ICD-10-CM

## 2022-08-15 RX ORDER — ESZOPICLONE 3 MG/1
3 TABLET, FILM COATED ORAL AT BEDTIME
Qty: 30 TABLET | Refills: 5 | Status: SHIPPED | OUTPATIENT
Start: 2022-08-15 | End: 2023-01-13

## 2022-09-24 ENCOUNTER — HEALTH MAINTENANCE LETTER (OUTPATIENT)
Age: 42
End: 2022-09-24

## 2023-01-12 ENCOUNTER — MYC MEDICAL ADVICE (OUTPATIENT)
Dept: FAMILY MEDICINE | Facility: CLINIC | Age: 43
End: 2023-01-12

## 2023-01-12 DIAGNOSIS — F51.04 CHRONIC INSOMNIA: ICD-10-CM

## 2023-01-13 RX ORDER — ESZOPICLONE 2 MG/1
2 TABLET, FILM COATED ORAL AT BEDTIME
Qty: 30 TABLET | Refills: 0 | Status: SHIPPED | OUTPATIENT
Start: 2023-01-13 | End: 2023-06-20

## 2023-01-13 RX ORDER — NORTRIPTYLINE HYDROCHLORIDE 50 MG/1
50-100 CAPSULE ORAL AT BEDTIME
Qty: 60 CAPSULE | Refills: 1 | Status: SHIPPED | OUTPATIENT
Start: 2023-01-13 | End: 2023-03-21

## 2023-03-21 ENCOUNTER — MYC MEDICAL ADVICE (OUTPATIENT)
Dept: FAMILY MEDICINE | Facility: CLINIC | Age: 43
End: 2023-03-21

## 2023-03-21 DIAGNOSIS — F51.04 CHRONIC INSOMNIA: ICD-10-CM

## 2023-03-21 RX ORDER — NORTRIPTYLINE HYDROCHLORIDE 50 MG/1
50-100 CAPSULE ORAL AT BEDTIME
Qty: 60 CAPSULE | Refills: 1 | Status: SHIPPED | OUTPATIENT
Start: 2023-03-21 | End: 2023-07-17

## 2023-03-21 NOTE — TELEPHONE ENCOUNTER
Per pt danielle requesting refill for nortriptyline but states hasn't been working great but does sruthi some relief and wondering if higher dosage is appropriate. Please advise,    Thanks!  Dimitris Martin RN   [facility]

## 2023-05-08 ENCOUNTER — HEALTH MAINTENANCE LETTER (OUTPATIENT)
Age: 43
End: 2023-05-08

## 2023-06-20 ENCOUNTER — MYC MEDICAL ADVICE (OUTPATIENT)
Dept: FAMILY MEDICINE | Facility: CLINIC | Age: 43
End: 2023-06-20

## 2023-06-20 DIAGNOSIS — F51.04 CHRONIC INSOMNIA: ICD-10-CM

## 2023-06-20 RX ORDER — ESZOPICLONE 3 MG/1
3 TABLET, FILM COATED ORAL AT BEDTIME
Qty: 30 TABLET | Refills: 0 | Status: CANCELLED | OUTPATIENT
Start: 2023-06-20

## 2023-06-20 RX ORDER — ESZOPICLONE 2 MG/1
2 TABLET, FILM COATED ORAL AT BEDTIME
Qty: 30 TABLET | Refills: 0 | Status: SHIPPED | OUTPATIENT
Start: 2023-06-20 | End: 2023-07-17

## 2023-06-20 NOTE — TELEPHONE ENCOUNTER
Per pt danielle uses [facility] in [facility].    Thanks!  Dimitris Martin RN   [facility]

## 2023-07-10 ASSESSMENT — ENCOUNTER SYMPTOMS
DIARRHEA: 0
DYSURIA: 0
SORE THROAT: 0
FREQUENCY: 0
NAUSEA: 0
FEVER: 0
HEMATURIA: 0
EYE PAIN: 0
PARESTHESIAS: 0
ABDOMINAL PAIN: 0
HEMATOCHEZIA: 0
NERVOUS/ANXIOUS: 0
COUGH: 0
JOINT SWELLING: 0
WEAKNESS: 0
ARTHRALGIAS: 0
HEARTBURN: 0
CONSTIPATION: 0
DIZZINESS: 0
CHILLS: 0
SHORTNESS OF BREATH: 0
PALPITATIONS: 0
MYALGIAS: 0
HEADACHES: 1

## 2023-07-17 ENCOUNTER — OFFICE VISIT (OUTPATIENT)
Dept: FAMILY MEDICINE | Facility: CLINIC | Age: 43
End: 2023-07-17

## 2023-07-17 VITALS
HEART RATE: 65 BPM | OXYGEN SATURATION: 99 % | TEMPERATURE: 98.1 F | BODY MASS INDEX: 27.02 KG/M2 | SYSTOLIC BLOOD PRESSURE: 132 MMHG | RESPIRATION RATE: 16 BRPM | DIASTOLIC BLOOD PRESSURE: 84 MMHG | WEIGHT: 193 LBS | HEIGHT: 71 IN

## 2023-07-17 DIAGNOSIS — F51.04 CHRONIC INSOMNIA: ICD-10-CM

## 2023-07-17 DIAGNOSIS — Z00.00 ROUTINE GENERAL MEDICAL EXAMINATION AT A HEALTH CARE FACILITY: Primary | ICD-10-CM

## 2023-07-17 DIAGNOSIS — R51.9 NONINTRACTABLE EPISODIC HEADACHE, UNSPECIFIED HEADACHE TYPE: ICD-10-CM

## 2023-07-17 PROBLEM — U07.1 PNEUMONIA DUE TO 2019 NOVEL CORONAVIRUS: Status: RESOLVED | Noted: 2021-04-10 | Resolved: 2023-07-17

## 2023-07-17 PROBLEM — J12.82 PNEUMONIA DUE TO 2019 NOVEL CORONAVIRUS: Status: RESOLVED | Noted: 2021-04-10 | Resolved: 2023-07-17

## 2023-07-17 PROCEDURE — 99396 PREV VISIT EST AGE 40-64: CPT | Performed by: FAMILY MEDICINE

## 2023-07-17 RX ORDER — DARIDOREXANT 25 MG/1
25 TABLET, FILM COATED ORAL AT BEDTIME
Qty: 30 TABLET | Refills: 1 | Status: SHIPPED | OUTPATIENT
Start: 2023-07-17 | End: 2024-03-20

## 2023-07-17 RX ORDER — ESZOPICLONE 3 MG/1
3 TABLET, FILM COATED ORAL AT BEDTIME
Qty: 30 TABLET | Refills: 1 | Status: SHIPPED | OUTPATIENT
Start: 2023-07-17 | End: 2024-03-20

## 2023-07-17 ASSESSMENT — ENCOUNTER SYMPTOMS
NAUSEA: 0
PALPITATIONS: 0
HEARTBURN: 0
ARTHRALGIAS: 0
FREQUENCY: 0
DYSURIA: 0
JOINT SWELLING: 0
HEADACHES: 1
COUGH: 0
DIARRHEA: 0
EYE PAIN: 0
PARESTHESIAS: 0
HEMATURIA: 0
HEMATOCHEZIA: 0
FEVER: 0
MYALGIAS: 0
ABDOMINAL PAIN: 0
DIZZINESS: 0
CONSTIPATION: 0
NERVOUS/ANXIOUS: 0
CHILLS: 0
SHORTNESS OF BREATH: 0
WEAKNESS: 0
SORE THROAT: 0

## 2023-07-17 ASSESSMENT — PATIENT HEALTH QUESTIONNAIRE - PHQ9
SUM OF ALL RESPONSES TO PHQ QUESTIONS 1-9: 13
SUM OF ALL RESPONSES TO PHQ QUESTIONS 1-9: 13
10. IF YOU CHECKED OFF ANY PROBLEMS, HOW DIFFICULT HAVE THESE PROBLEMS MADE IT FOR YOU TO DO YOUR WORK, TAKE CARE OF THINGS AT HOME, OR GET ALONG WITH OTHER PEOPLE: SOMEWHAT DIFFICULT

## 2023-07-17 ASSESSMENT — PAIN SCALES - GENERAL: PAINLEVEL: SEVERE PAIN (6)

## 2023-07-17 NOTE — PROGRESS NOTES
SUBJECTIVE:   CC: Kiran is an 42 year old who presents for preventative health visit.       7/17/2023     9:19 AM   Additional Questions   Roomed by Lisandra     Healthy Habits:     Getting at least 3 servings of Calcium per day:  Yes    Bi-annual eye exam:  NO    Dental care twice a year:  NO    Sleep apnea or symptoms of sleep apnea:  Excessive snoring and Sleep apnea    Diet:  Regular (no restrictions)    Frequency of exercise:  2-3 days/week    Duration of exercise:  15-30 minutes    Taking medications regularly:  Yes    Medication side effects:  Other    Additional concerns today:  Yes      Today's PHQ-9 Score:       7/17/2023     9:02 AM   PHQ-9 SCORE   PHQ-9 Total Score Jeffyhart 13 (Moderate depression)   PHQ-9 Total Score 13         Patient is here today for routine physical.  He deals with chronic insomnia both with sleep initiation, middle insomnia, and terminal insomnia.  When he is not sleeping well that seems to contribute to an increased frequency of headaches.  He has tried numerous medications for insomnia in the past and is currently working with a therapist as well but does not feel like he is making much progress.  Most recently has been on Lunesta 2 mg daily and he would be interested in trying the 3 mg dose to see if that is helpful.  He also brought in a list of other medications and we settled on potentially trying Quviviq.  I shared that I am less familiar with this medication but we reviewed some side effects that could be prominent.    He did have a relationship break-up about 2 months ago.  Since then his insomnia has worsened and this is contributed to increased frequency of headaches.  His long-term plan is that he would like to get off the sleep inducing medications.  In talking about the headaches today when its most significant it tends to occur between the eyes and radiate to the eyes.  He describes it as some throbbing.  No significant nausea or vomiting but may be some mild sensitivity  to light and sound.  If he pushes on his eyes and will feel painful.  He has not noticed any visual changes but might occasionally have visual scotomata.  He uses THC Gummies which are helpful.  Acetaminophen is not helpful.  He will wake up with a headache.  He does not have any other neurologic symptoms with the headaches such as visual field changes, hearing loss, change in tinnitus (baseline for more than 10 years bilateral tinnitus), slurred speech, or weakness in an arm or leg.  He was diagnosed by orthopedics about a year ago with cubital tunnel syndrome that has not improved with conservative management so I suggested follow-up with them.  No change or precipitation of the headache with Valsalva, coughing, or sneezing.    Pt requesting a plan moving forward regarding sleep issues.      Social History     Tobacco Use     Smoking status: Former     Packs/day: 0.50     Years: 10.00     Pack years: 5.00     Types: Cigarettes     Quit date: 2006     Years since quittin.5     Passive exposure: Past     Smokeless tobacco: Never   Substance Use Topics     Alcohol use: Yes     Comment: 0-few drinks per month             7/10/2023    10:40 AM   Alcohol Use   Prescreen: >3 drinks/day or >7 drinks/week? No          No data to display                Last PSA: No results found for: PSA    Reviewed orders with patient. Reviewed health maintenance and updated orders accordingly - Yes  Patient Active Problem List   Diagnosis     Chronic insomnia     Essential hypertension     Cubital tunnel syndrome of both upper extremities     Past Surgical History:   Procedure Laterality Date     NO HISTORY OF SURGERY         Social History     Tobacco Use     Smoking status: Former     Packs/day: 0.50     Years: 10.00     Pack years: 5.00     Types: Cigarettes     Quit date: 2006     Years since quittin.5     Passive exposure: Past     Smokeless tobacco: Never   Substance Use Topics     Alcohol use: Yes     Comment:  "0-few drinks per month     Family History   Problem Relation Age of Onset     Hypertension Father      Depression Sister      Depression Sister          Current Outpatient Medications   Medication Sig Dispense Refill     Daridorexant HCl (QUVIVIQ) 25 MG TABS Take 25 mg by mouth At Bedtime 30 tablet 1     eszopiclone (LUNESTA) 3 MG tablet Take 1 tablet (3 mg) by mouth At Bedtime 30 tablet 1     fish oil-omega-3 fatty acids 1000 MG capsule Take 2 g by mouth daily       Vitamin D3 (CHOLECALCIFEROL) 125 MCG (5000 UT) tablet Take 1 tablet by mouth daily       No Known Allergies    Reviewed and updated as needed this visit by clinical staff   Tobacco  Allergies  Meds  Problems  Med Hx  Surg Hx  Fam Hx  Soc   Hx        Reviewed and updated as needed this visit by Provider   Tobacco  Allergies  Meds  Problems  Med Hx  Surg Hx  Fam Hx  Soc   Hx           Review of Systems   Constitutional: Negative for chills and fever.   HENT: Negative for congestion, ear pain, hearing loss and sore throat.    Eyes: Negative for pain and visual disturbance.   Respiratory: Negative for cough and shortness of breath.    Cardiovascular: Negative for chest pain, palpitations and peripheral edema.   Gastrointestinal: Negative for abdominal pain, constipation, diarrhea, heartburn, hematochezia and nausea.   Genitourinary: Negative for dysuria, frequency, genital sores, hematuria, impotence, penile discharge and urgency.   Musculoskeletal: Negative for arthralgias, joint swelling and myalgias.   Skin: Negative for rash.   Neurological: Positive for headaches. Negative for dizziness, weakness and paresthesias.   Psychiatric/Behavioral: Negative for mood changes. The patient is not nervous/anxious.          OBJECTIVE:   /84   Pulse 65   Temp 98.1  F (36.7  C) (Temporal)   Resp 16   Ht 1.803 m (5' 11\")   Wt 87.5 kg (193 lb)   SpO2 99%   BMI 26.92 kg/m      Physical Exam  GENERAL: healthy, alert and no distress  EYES: Eyes " grossly normal to inspection, PERRL and conjunctivae and sclerae normal  HENT: ear canals and TM's normal, nose and mouth without ulcers or lesions, funduscopic examination appears to show sharp optic disks  NECK: no adenopathy, no asymmetry, masses, or scars and thyroid normal to palpation  RESP: lungs clear to auscultation - no rales, rhonchi or wheezes  CV: regular rate and rhythm, normal S1 S2, no S3 or S4, no murmur, click or rub, no peripheral edema and peripheral pulses strong  ABDOMEN: soft, nontender, no hepatosplenomegaly, no masses and bowel sounds normal   (male): normal male genitalia without lesions or urethral discharge, no hernia  MS: no gross musculoskeletal defects noted, no edema  SKIN: no suspicious lesions or rashes  NEURO: Normal strength and tone, mentation intact and speech normal  NEURO: Normal strength and tone, sensory exam grossly normal, mentation intact, speech normal, cranial nerves 2-12 intact, DTR's normal and symmetric 2+, Romberg normal and gait normal, finger-nose-finger normal.  PSYCH: mentation appears normal, affect normal/bright    Diagnostic Test Results:  Labs reviewed in Epic    ASSESSMENT/PLAN:   (Z00.00) Routine general medical examination at a health care facility  (primary encounter diagnosis)  Comment: Routine health issues appropriate for age reviewed.  Follow-up would be recommended in 1 year.  Plan: REVIEW OF HEALTH MAINTENANCE PROTOCOL ORDERS             (F51.04) Chronic insomnia  Comment: Options were reviewed with the patient.  I did refill his Lunesta at the higher dose and issued a prescription for the other medication that was printed today and he can look around for some low cost options.  Side effects were reviewed with the patient.  Adult sleep medicine referral was placed primarily to consult a provider and consideration for CBT related to chronic insomnia.  I think this is his best bet for getting off any medications.  Plan: Adult Sleep Eval &  "Management          Referral, Daridorexant HCl (QUVIVIQ) 25 MG         TABS, eszopiclone (LUNESTA) 3 MG tablet             (R51.9) Nonintractable episodic headache, unspecified headache type  Comment: The etiology of the headache is not entirely clear.  He is seeing ENT later this month because of concerns regarding congestion and septal deviation which may be a contributor.  It has some migrainous type features.  He does find THC Gummies helpful and I think it is okay to continue those on an as-needed basis.  Suggested a long-acting NSAID may be combined with pseudoephedrine to see if that would be helpful.  Neurologic examination was normal and I did not feel that imaging was necessary at this time.  Plan:     Patient has been advised of split billing requirements and indicates understanding: Yes    Depression Screening Follow Up        7/17/2023     9:02 AM   PHQ   PHQ-9 Total Score 13   Q9: Thoughts of better off dead/self-harm past 2 weeks Not at all         Follow Up Actions Taken  Crisis resource information provided in After Visit Summary  Patient has experienced a recent significant life event.  Patient counseled, no additional follow up at this time.  Referred patient back to current mental health provider.       COUNSELING:   Reviewed preventive health counseling, as reflected in patient instructions       Regular exercise       Healthy diet/nutrition       Vision screening       Hearing screening       Colorectal cancer screening       Prostate cancer screening      BMI:   Estimated body mass index is 26.92 kg/m  as calculated from the following:    Height as of this encounter: 1.803 m (5' 11\").    Weight as of this encounter: 87.5 kg (193 lb).   Weight management plan: Discussed healthy diet and exercise guidelines      He reports that he quit smoking about 17 years ago. His smoking use included cigarettes. He has a 5.00 pack-year smoking history. He has been exposed to tobacco smoke. He has " never used smokeless tobacco.            Bertrand Sanchez MD  [facility]  Answers for HPI/ROS submitted by the patient on 7/17/2023  If you checked off any problems, how difficult have these problems made it for you to do your work, take care of things at home, or get along with other people?: Somewhat difficult  PHQ9 TOTAL SCORE: 13

## 2023-07-18 ENCOUNTER — MYC MEDICAL ADVICE (OUTPATIENT)
Dept: FAMILY MEDICINE | Facility: CLINIC | Age: 43
End: 2023-07-18

## 2023-07-18 DIAGNOSIS — F51.04 CHRONIC INSOMNIA: Primary | ICD-10-CM

## 2023-07-26 ENCOUNTER — OFFICE VISIT (OUTPATIENT)
Dept: OTOLARYNGOLOGY | Facility: CLINIC | Age: 43
End: 2023-07-26

## 2023-07-26 VITALS — RESPIRATION RATE: 18 BRPM | OXYGEN SATURATION: 96 % | HEART RATE: 77 BPM

## 2023-07-26 DIAGNOSIS — G47.33 OSA (OBSTRUCTIVE SLEEP APNEA): Primary | ICD-10-CM

## 2023-07-26 DIAGNOSIS — J34.2 DEVIATED NASAL SEPTUM: ICD-10-CM

## 2023-07-26 DIAGNOSIS — J34.3 NASAL TURBINATE HYPERTROPHY: ICD-10-CM

## 2023-07-26 DIAGNOSIS — G47.00 INSOMNIA, UNSPECIFIED TYPE: ICD-10-CM

## 2023-07-26 DIAGNOSIS — J34.89 NASAL OBSTRUCTION: ICD-10-CM

## 2023-07-26 PROCEDURE — 99204 OFFICE O/P NEW MOD 45 MIN: CPT | Performed by: OTOLARYNGOLOGY

## 2023-07-26 NOTE — PROGRESS NOTES
Chief Complaint - Snoring and nasal obstruction    History of Present Illness - Roger Law is a 42 year old male who presents for evaluation of snoring and possible nasal obstruction as a component. He has insomnia. The patient describes symptoms of poor sleep, snoring, insomnia, nasal obstruction for the past 10+ years. They describe poor sleep, restless sleep, daytime tiredness, etc. The patient has had a sleep study. He notes an AHI of 6 per his reports. This was in [place]. They have tried CPAP and a dental device to improve snoring and sleep apnea. He can't tolerate them. The patient describes nasal obstruction. He has tried sleep aids. They don't help much. The patient notes no allergies. Possible history of nasal trauma. No prior history of nasal surgery, sinus surgery.     Tests personally reviewed today for this visit:   1.) CBC normal 2/23/22  2.) CMP normal 2/23/22  3.) Lipid profile 2/23/22 shows mildly elevated cholesterol.    Past Medical History -   Patient Active Problem List   Diagnosis     Chronic insomnia     Essential hypertension     Cubital tunnel syndrome of both upper extremities       Current Medications -   Current Outpatient Medications:      amitriptyline (ELAVIL) 25 MG tablet, Take 1-2 tablets (25-50 mg) by mouth At Bedtime, Disp: 60 tablet, Rfl: 1     Daridorexant HCl (QUVIVIQ) 25 MG TABS, Take 25 mg by mouth At Bedtime, Disp: 30 tablet, Rfl: 1     eszopiclone (LUNESTA) 3 MG tablet, Take 1 tablet (3 mg) by mouth At Bedtime, Disp: 30 tablet, Rfl: 1     fish oil-omega-3 fatty acids 1000 MG capsule, Take 2 g by mouth daily, Disp: , Rfl:      Vitamin D3 (CHOLECALCIFEROL) 125 MCG (5000 UT) tablet, Take 1 tablet by mouth daily, Disp: , Rfl:     Allergies - No Known Allergies    Social History -   Social History     Socioeconomic History     Marital status: Single     Number of children: 0   Occupational History     Occupation: Psychotherapist     Comment: [employer]    Tobacco Use     Smoking status: Former     Packs/day: 0.50     Years: 10.00     Pack years: 5.00     Types: Cigarettes     Quit date: 2006     Years since quittin.5     Passive exposure: Past     Smokeless tobacco: Never   Vaping Use     Vaping Use: Never used   Substance and Sexual Activity     Alcohol use: Yes     Comment: 0-few drinks per month     Drug use: Never     Sexual activity: Not Currently     Partners: Female   Social History Narrative    Living with brother and sister.  One dog.       Family History -   Family History   Problem Relation Age of Onset     Hypertension Father      Depression Sister      Depression Sister      Physical Exam  Pulse 77   Resp 18   SpO2 96%   General - The patient is slightly over weight, BMI 27. Alert, answers questions and cooperates with examination appropriately.   Neurologic - CN II-XII are grossly intact. No focal neurologic deficits.  Voice and Breathing - The patient was breathing comfortably without the use of accessory muscles. There was no wheezing, stridor, or stertor.  The patients voice was clear and strong.  Eyes - Extraocular movements intact. Sclera were not icteric or injected, conjunctiva were pink and moist.  Mouth - Examination of the oral cavity showed pink, healthy oral mucosa. No lesions or ulcerations noted.  The tongue was mobile and midline.  Throat - The walls of the oropharynx were smooth, symmetric, and had no lesions or ulcerations. The uvula was midline on elevation. Freidman 3 of 4. Tonsils 0-1+  Nose - External contour is symmetric, no gross deflection or scars.  Nasal mucosa is pink and moist. The turbinates are hypertorphic. The septum was deviated to the right and obstructive.  No polyps, masses, or purulence noted on examination.  Neck -  Soft. Non-tender. Palpation of the occipital, submental, submandibular, internal jugular chain, and supraclavicular nodes did not demonstrate any abnormal lymph nodes or masses. No parotid  masses. Palpation of the thyroid was soft and smooth, with no nodules or goiter appreciated.  The trachea was mobile and midline.      A/P - Roger Law is a 42 year old male with snoring and mild sleep apnea per his reports. He has a hard time sleeping. Has tried CPAP, oral appliance, and he cannot tolerate these. He has nasal obstruction due to deviated septum and turbinate hypertrophy. However, also has Chand tongue palate position of 3 of 4. I think more of the sleep apnea is tongue position and not his nasal obstruction. He can consider Inspire, and if he wants to pursue this I will send him to Dr. Reyes or Dr. Romeo.     For nasal obstruction, likely due to septal deviation and turbinate hypertrophy he has tried and failed medical treatment. He can consider nasal surgery in the form of septoplasty with inferior turbinate reduction and out-fracture. I explained while this likely will help nasal breathing it may not help his sleeping or sleep apnea. He understands.     I counseled the patient on the risks of surgery, including infection, bleeding, risks of general anesthesia, the risk of failure of the surgery to relieve nasal obstruction, the possible need for additional procedures, the possible need for additional medical therapy, and the possibility of alteration of the appearance of the external nose.  They understood and wished to proceed to scheduling.      Jordan Lozano MD  Otolaryngology  [facility]

## 2023-07-26 NOTE — LETTER
7/26/2023         RE: Roger Law  [address]  [place] [address]        Dear Colleague,    Thank you for referring your patient, Roger Law, to the [facility]. Please see a copy of my visit note below.    Chief Complaint - Snoring and nasal obstruction    History of Present Illness - Roger Law is a 42 year old male who presents for evaluation of snoring and possible nasal obstruction as a component. He has insomnia. The patient describes symptoms of poor sleep, snoring, insomnia, nasal obstruction for the past 10+ years. They describe poor sleep, restless sleep, daytime tiredness, etc. The patient has had a sleep study. He notes an AHI of 6 per his reports. This was in [place]. They have tried CPAP and a dental device to improve snoring and sleep apnea. He can't tolerate them. The patient describes nasal obstruction. He has tried sleep aids. They don't help much. The patient notes no allergies. Possible history of nasal trauma. No prior history of nasal surgery, sinus surgery.     Tests personally reviewed today for this visit:   1.) CBC normal 2/23/22  2.) CMP normal 2/23/22  3.) Lipid profile 2/23/22 shows mildly elevated cholesterol.    Past Medical History -   Patient Active Problem List   Diagnosis     Chronic insomnia     Essential hypertension     Cubital tunnel syndrome of both upper extremities       Current Medications -   Current Outpatient Medications:      amitriptyline (ELAVIL) 25 MG tablet, Take 1-2 tablets (25-50 mg) by mouth At Bedtime, Disp: 60 tablet, Rfl: 1     Daridorexant HCl (QUVIVIQ) 25 MG TABS, Take 25 mg by mouth At Bedtime, Disp: 30 tablet, Rfl: 1     eszopiclone (LUNESTA) 3 MG tablet, Take 1 tablet (3 mg) by mouth At Bedtime, Disp: 30 tablet, Rfl: 1     fish oil-omega-3 fatty acids 1000 MG capsule, Take 2 g by mouth daily, Disp: , Rfl:      Vitamin D3 (CHOLECALCIFEROL) 125 MCG (5000 UT) tablet, Take 1 tablet by mouth  daily, Disp: , Rfl:     Allergies - No Known Allergies    Social History -   Social History     Socioeconomic History     Marital status: Single     Number of children: 0   Occupational History     Occupation: Psychotherapist     Comment: [employer]   Tobacco Use     Smoking status: Former     Packs/day: 0.50     Years: 10.00     Pack years: 5.00     Types: Cigarettes     Quit date: 2006     Years since quittin.5     Passive exposure: Past     Smokeless tobacco: Never   Vaping Use     Vaping Use: Never used   Substance and Sexual Activity     Alcohol use: Yes     Comment: 0-few drinks per month     Drug use: Never     Sexual activity: Not Currently     Partners: Female   Social History Narrative    Living with brother and sister.  One dog.       Family History -   Family History   Problem Relation Age of Onset     Hypertension Father      Depression Sister      Depression Sister      Physical Exam  Pulse 77   Resp 18   SpO2 96%   General - The patient is slightly over weight, BMI 27. Alert, answers questions and cooperates with examination appropriately.   Neurologic - CN II-XII are grossly intact. No focal neurologic deficits.  Voice and Breathing - The patient was breathing comfortably without the use of accessory muscles. There was no wheezing, stridor, or stertor.  The patients voice was clear and strong.  Eyes - Extraocular movements intact. Sclera were not icteric or injected, conjunctiva were pink and moist.  Mouth - Examination of the oral cavity showed pink, healthy oral mucosa. No lesions or ulcerations noted.  The tongue was mobile and midline.  Throat - The walls of the oropharynx were smooth, symmetric, and had no lesions or ulcerations. The uvula was midline on elevation. Freidman 3 of 4. Tonsils 0-1+  Nose - External contour is symmetric, no gross deflection or scars.  Nasal mucosa is pink and moist. The turbinates are hypertorphic. The septum was deviated to the right and  obstructive.  No polyps, masses, or purulence noted on examination.  Neck -  Soft. Non-tender. Palpation of the occipital, submental, submandibular, internal jugular chain, and supraclavicular nodes did not demonstrate any abnormal lymph nodes or masses. No parotid masses. Palpation of the thyroid was soft and smooth, with no nodules or goiter appreciated.  The trachea was mobile and midline.      A/P - Roger Law is a 42 year old male with snoring and mild sleep apnea per his reports. He has a hard time sleeping. Has tried CPAP, oral appliance, and he cannot tolerate these. He has nasal obstruction due to deviated septum and turbinate hypertrophy. However, also has Chand tongue palate position of 3 of 4. I think more of the sleep apnea is tongue position and not his nasal obstruction. He can consider Inspire, and if he wants to pursue this I will send him to Dr. Reyes or Dr. Romeo.     For nasal obstruction, likely due to septal deviation and turbinate hypertrophy he has tried and failed medical treatment. He can consider nasal surgery in the form of septoplasty with inferior turbinate reduction and out-fracture. I explained while this likely will help nasal breathing it may not help his sleeping or sleep apnea. He understands.     I counseled the patient on the risks of surgery, including infection, bleeding, risks of general anesthesia, the risk of failure of the surgery to relieve nasal obstruction, the possible need for additional procedures, the possible need for additional medical therapy, and the possibility of alteration of the appearance of the external nose.  They understood and wished to proceed to scheduling.      Jordan Lozano MD  Otolaryngology  [facility]          Again, thank you for allowing me to participate in the care of your patient.        Sincerely,        Jordan Lozano MD

## 2023-08-07 ENCOUNTER — MYC MEDICAL ADVICE (OUTPATIENT)
Dept: FAMILY MEDICINE | Facility: CLINIC | Age: 43
End: 2023-08-07

## 2023-08-07 DIAGNOSIS — F51.04 CHRONIC INSOMNIA: ICD-10-CM

## 2023-08-07 NOTE — TELEPHONE ENCOUNTER
Please see pt's My chart message. Order pended with previous dose.    Christine Rogers RN  [facility]

## 2023-10-16 ENCOUNTER — MYC MEDICAL ADVICE (OUTPATIENT)
Dept: FAMILY MEDICINE | Facility: CLINIC | Age: 43
End: 2023-10-16

## 2023-10-16 DIAGNOSIS — F51.04 CHRONIC INSOMNIA: ICD-10-CM

## 2023-12-13 DIAGNOSIS — F51.04 CHRONIC INSOMNIA: ICD-10-CM

## 2024-02-15 ENCOUNTER — MYC MEDICAL ADVICE (OUTPATIENT)
Dept: FAMILY MEDICINE | Facility: CLINIC | Age: 44
End: 2024-02-15
Payer: COMMERCIAL

## 2024-02-16 NOTE — TELEPHONE ENCOUNTER
Pt calling to schedule pre-op, scheduled on 2/26 with Dr. Vela.    Dimitris Martin RN   [facility]

## 2024-02-26 ENCOUNTER — OFFICE VISIT (OUTPATIENT)
Dept: FAMILY MEDICINE | Facility: CLINIC | Age: 44
End: 2024-02-26
Payer: COMMERCIAL

## 2024-02-26 VITALS
WEIGHT: 212 LBS | HEIGHT: 71 IN | BODY MASS INDEX: 29.68 KG/M2 | RESPIRATION RATE: 18 BRPM | TEMPERATURE: 98.2 F | HEART RATE: 107 BPM | DIASTOLIC BLOOD PRESSURE: 88 MMHG | OXYGEN SATURATION: 98 % | SYSTOLIC BLOOD PRESSURE: 138 MMHG

## 2024-02-26 DIAGNOSIS — R73.09 ELEVATED GLUCOSE: ICD-10-CM

## 2024-02-26 DIAGNOSIS — F51.04 CHRONIC INSOMNIA: ICD-10-CM

## 2024-02-26 DIAGNOSIS — Z12.11 SCREEN FOR COLON CANCER: ICD-10-CM

## 2024-02-26 DIAGNOSIS — I10 ESSENTIAL HYPERTENSION: ICD-10-CM

## 2024-02-26 DIAGNOSIS — Z01.818 PREOP GENERAL PHYSICAL EXAM: Primary | ICD-10-CM

## 2024-02-26 LAB
ALBUMIN SERPL BCG-MCNC: 4.4 G/DL (ref 3.5–5.2)
ALP SERPL-CCNC: 78 U/L (ref 40–150)
ALT SERPL W P-5'-P-CCNC: 46 U/L (ref 0–70)
ANION GAP SERPL CALCULATED.3IONS-SCNC: 12 MMOL/L (ref 7–15)
AST SERPL W P-5'-P-CCNC: 29 U/L (ref 0–45)
BILIRUB SERPL-MCNC: 0.4 MG/DL
BUN SERPL-MCNC: 9.5 MG/DL (ref 6–20)
CALCIUM SERPL-MCNC: 9.1 MG/DL (ref 8.6–10)
CHLORIDE SERPL-SCNC: 107 MMOL/L (ref 98–107)
CREAT SERPL-MCNC: 0.97 MG/DL (ref 0.67–1.17)
DEPRECATED HCO3 PLAS-SCNC: 23 MMOL/L (ref 22–29)
EGFRCR SERPLBLD CKD-EPI 2021: >90 ML/MIN/1.73M2
ERYTHROCYTE [DISTWIDTH] IN BLOOD BY AUTOMATED COUNT: 13.3 % (ref 10–15)
GLUCOSE SERPL-MCNC: 92 MG/DL (ref 70–99)
HBA1C MFR BLD: 5.5 % (ref 0–5.6)
HCT VFR BLD AUTO: 43.2 % (ref 40–53)
HGB BLD-MCNC: 14.8 G/DL (ref 13.3–17.7)
MCH RBC QN AUTO: 27.7 PG (ref 26.5–33)
MCHC RBC AUTO-ENTMCNC: 34.3 G/DL (ref 31.5–36.5)
MCV RBC AUTO: 81 FL (ref 78–100)
PLATELET # BLD AUTO: 220 10E3/UL (ref 150–450)
POTASSIUM SERPL-SCNC: 4 MMOL/L (ref 3.4–5.3)
PROT SERPL-MCNC: 6.7 G/DL (ref 6.4–8.3)
RBC # BLD AUTO: 5.34 10E6/UL (ref 4.4–5.9)
SODIUM SERPL-SCNC: 142 MMOL/L (ref 135–145)
WBC # BLD AUTO: 5.8 10E3/UL (ref 4–11)

## 2024-02-26 PROCEDURE — 36415 COLL VENOUS BLD VENIPUNCTURE: CPT | Performed by: FAMILY MEDICINE

## 2024-02-26 PROCEDURE — 83036 HEMOGLOBIN GLYCOSYLATED A1C: CPT | Performed by: FAMILY MEDICINE

## 2024-02-26 PROCEDURE — 99213 OFFICE O/P EST LOW 20 MIN: CPT | Performed by: FAMILY MEDICINE

## 2024-02-26 PROCEDURE — 80053 COMPREHEN METABOLIC PANEL: CPT | Performed by: FAMILY MEDICINE

## 2024-02-26 PROCEDURE — 85027 COMPLETE CBC AUTOMATED: CPT | Performed by: FAMILY MEDICINE

## 2024-02-26 ASSESSMENT — PAIN SCALES - GENERAL: PAINLEVEL: NO PAIN (0)

## 2024-02-26 NOTE — PROGRESS NOTES
Preoperative Evaluation  [facility]  [address]  [address]  [facility] [address]  Phone: [phone]  Fax: [phone]  Primary Provider: Bertrand Sanchez  Pre-op Performing Provider: WERNER ROBLERO  Feb 26, 2024       Kiran is a 43 year old, presenting for the following:  Pre-Op Exam        2/26/2024     1:27 PM   Additional Questions   Roomed by Georgina DAVIS   Failed to redirect to the Timeline version of the [employer] SmartLink.  Surgical Information  Surgery/Procedure: Drug Induced Sleep Endoscopy   Surgery Location: [facility]  Surgeon: Dr. Hugo Montague  Surgery Date: 03/01/2024  Time of Surgery: tbd  Where patient plans to recover: At home with family  Fax number for surgical facility: [phone]    Assessment & Plan     The proposed surgical procedure is considered LOW risk.    Preop general physical exam  Ok for procedure  - CBC with platelets; Future  - Comprehensive metabolic panel (BMP + Alb, Alk Phos, ALT, AST, Total. Bili, TP); Future  - Hemoglobin A1c; Future  - CBC with platelets  - Comprehensive metabolic panel (BMP + Alb, Alk Phos, ALT, AST, Total. Bili, TP)  - Hemoglobin A1c    Chronic insomnia  worsening    Screen for colon cancer  Will do  - Colonoscopy Screening  Referral; Future    Essential hypertension  Borderline elevated  Will check at home and send us numbers   Try tolose weight  - Comprehensive metabolic panel (BMP + Alb, Alk Phos, ALT, AST, Total. Bili, TP); Future  - Comprehensive metabolic panel (BMP + Alb, Alk Phos, ALT, AST, Total. Bili, TP)    Elevated glucose  Check for DIABETES MELLITUS   - Hemoglobin A1c; Future  - Hemoglobin A1c          - No identified additional risk factors other than previously addressed        Recommendation  APPROVAL GIVEN to proceed with proposed procedure, without further diagnostic evaluation.    Review of external notes as documented elsewhere in note  14 minutes spent by me  on the date of the encounter doing chart review and review of outside records     Subjective       HPI related to upcoming procedure:   Encounter Diagnoses   Name Primary?    Preop general physical exam Yes    Chronic insomnia     Screen for colon cancer     Essential hypertension     Elevated glucose              2/19/2024    10:37 AM   Preop Questions   1. Have you ever had a heart attack or stroke? No   2. Have you ever had surgery on your heart or blood vessels, such as a stent placement, a coronary artery bypass, or surgery on an artery in your head, neck, heart, or legs? No   3. Do you have chest pain with activity? No   4. Do you have a history of  heart failure? No   5. Do you currently have a cold, bronchitis or symptoms of other infection? No   6. Do you have a cough, shortness of breath, or wheezing? No   7. Do you or anyone in your family have previous history of blood clots? No   8. Do you or does anyone in your family have a serious bleeding problem such as prolonged bleeding following surgeries or cuts? No   9. Have you ever had problems with anemia or been told to take iron pills? No   10. Have you had any abnormal blood loss such as black, tarry or bloody stools? No   11. Have you ever had a blood transfusion? No   12. Are you willing to have a blood transfusion if it is medically needed before, during, or after your surgery? Yes   13. Have you or any of your relatives ever had problems with anesthesia? No   14. Do you have sleep apnea, excessive snoring or daytime drowsiness? YES - hence the procedure   14a. Do you have a CPAP machine? No   15. Do you have any artifical heart valves or other implanted medical devices like a pacemaker, defibrillator, or continuous glucose monitor? No   16. Do you have artificial joints? No   17. Are you allergic to latex? No       Health Care Directive  Patient does not have a Health Care Directive or Living Will:     Preoperative Review of    reviewed - no  record of controlled substances prescribed.      Status of Chronic Conditions:  HYPERTENSION - Patient has longstanding history of HTN , currently denies any symptoms referable to elevated blood pressure. Specifically denies chest pain, palpitations, dyspnea, orthopnea, PND or peripheral edema. Blood pressure readings have been in normal range. Current medication regimen is as listed below. Patient denies any side effects of medication.     Patient Active Problem List    Diagnosis Date Noted    Cubital tunnel syndrome of both upper extremities 2022     Priority: Medium    Essential hypertension 2020     Priority: Medium    Chronic insomnia 10/01/2012     Priority: Medium      Past Medical History:   Diagnosis Date    Closed fracture of left tibia and fibula with routine healing     Depressive disorder     Hypertension     Pneumonia due to 2019 novel coronavirus 2021    Pneumonia due to 2019 novel coronavirus 4/10/2021     Past Surgical History:   Procedure Laterality Date    NO HISTORY OF SURGERY       Current Outpatient Medications   Medication Sig Dispense Refill    amitriptyline (ELAVIL) 25 MG tablet TAKE 2 TO 4 TABLETS BY MOUTH AT BEDTIME 120 tablet 3    Vitamin D3 (CHOLECALCIFEROL) 125 MCG (5000 UT) tablet Take 1 tablet by mouth daily      Daridorexant HCl (QUVIVIQ) 25 MG TABS Take 25 mg by mouth At Bedtime 30 tablet 1    eszopiclone (LUNESTA) 3 MG tablet Take 1 tablet (3 mg) by mouth At Bedtime 30 tablet 1    fish oil-omega-3 fatty acids 1000 MG capsule Take 2 g by mouth daily (Patient not taking: Reported on 2024)         No Known Allergies     Social History     Tobacco Use    Smoking status: Former     Packs/day: 0.50     Years: 10.00     Additional pack years: 0.00     Total pack years: 5.00     Types: Cigarettes     Quit date: 2006     Years since quittin.1     Passive exposure: Past    Smokeless tobacco: Never   Substance Use Topics    Alcohol use: Yes     Comment: 0-few  "drinks per month       History   Drug Use Unknown         Review of Systems    Review of Systems      Objective    /88   Pulse 107   Temp 98.2  F (36.8  C) (Temporal)   Resp 18   Ht 1.803 m (5' 10.98\")   Wt 96.2 kg (212 lb)   SpO2 98%   BMI 29.58 kg/m     Estimated body mass index is 29.58 kg/m  as calculated from the following:    Height as of this encounter: 1.803 m (5' 10.98\").    Weight as of this encounter: 96.2 kg (212 lb).  Physical Exam  GENERAL: alert and no distress  NECK: no adenopathy, no asymmetry, masses, or scars  RESP: lungs clear to auscultation - no rales, rhonchi or wheezes  CV: regular rate and rhythm, normal S1 S2, no S3 or S4, no murmur, click or rub, no peripheral edema  ABDOMEN: soft, nontender, no hepatosplenomegaly, no masses and bowel sounds normal  MS: no gross musculoskeletal defects noted, no edema    No results for input(s): \"HGB\", \"PLT\", \"INR\", \"NA\", \"POTASSIUM\", \"CR\", \"A1C\" in the last 04368 hours.     Diagnostics  Labs pending at this time.  Results will be reviewed when available.   No EKG required for low risk surgery (cataract, skin procedure, breast biopsy, etc).    Revised Cardiac Risk Index (RCRI)  The patient has the following serious cardiovascular risks for perioperative complications:   - No serious cardiac risks = 0 points     RCRI Interpretation: 0 points: Class I (very low risk - 0.4% complication rate)         Signed Electronically by: Grant Vela MD  Copy of this evaluation report is provided to requesting physician.         "

## 2024-03-20 ENCOUNTER — MYC MEDICAL ADVICE (OUTPATIENT)
Dept: FAMILY MEDICINE | Facility: CLINIC | Age: 44
End: 2024-03-20
Payer: COMMERCIAL

## 2024-03-20 DIAGNOSIS — F51.04 CHRONIC INSOMNIA: ICD-10-CM

## 2024-03-20 RX ORDER — AMITRIPTYLINE HYDROCHLORIDE 50 MG/1
100-150 TABLET ORAL AT BEDTIME
Qty: 90 TABLET | Refills: 2 | Status: SHIPPED | OUTPATIENT
Start: 2024-03-20 | End: 2024-05-14

## 2024-05-06 ENCOUNTER — MYC MEDICAL ADVICE (OUTPATIENT)
Dept: FAMILY MEDICINE | Facility: CLINIC | Age: 44
End: 2024-05-06
Payer: COMMERCIAL

## 2024-05-14 ENCOUNTER — OFFICE VISIT (OUTPATIENT)
Dept: FAMILY MEDICINE | Facility: CLINIC | Age: 44
End: 2024-05-14
Payer: COMMERCIAL

## 2024-05-14 VITALS
HEIGHT: 71 IN | HEART RATE: 83 BPM | BODY MASS INDEX: 29.26 KG/M2 | SYSTOLIC BLOOD PRESSURE: 142 MMHG | WEIGHT: 209 LBS | DIASTOLIC BLOOD PRESSURE: 90 MMHG | OXYGEN SATURATION: 97 % | RESPIRATION RATE: 18 BRPM | TEMPERATURE: 98 F

## 2024-05-14 DIAGNOSIS — I10 ESSENTIAL HYPERTENSION: ICD-10-CM

## 2024-05-14 DIAGNOSIS — K40.90 RIGHT INGUINAL HERNIA: ICD-10-CM

## 2024-05-14 DIAGNOSIS — G47.33 OBSTRUCTIVE SLEEP APNEA SYNDROME: ICD-10-CM

## 2024-05-14 DIAGNOSIS — Z01.818 PREOP GENERAL PHYSICAL EXAM: Primary | ICD-10-CM

## 2024-05-14 DIAGNOSIS — F51.04 CHRONIC INSOMNIA: ICD-10-CM

## 2024-05-14 PROCEDURE — 99214 OFFICE O/P EST MOD 30 MIN: CPT | Performed by: FAMILY MEDICINE

## 2024-05-14 RX ORDER — AMITRIPTYLINE HYDROCHLORIDE 50 MG/1
100-150 TABLET ORAL AT BEDTIME
Qty: 90 TABLET | Refills: 2 | Status: SHIPPED | OUTPATIENT
Start: 2024-05-14 | End: 2024-08-22

## 2024-05-14 NOTE — PATIENT INSTRUCTIONS
Preparing for Your Surgery  Getting started  A nurse will call you to review your health history and instructions. They will give you an arrival time based on your scheduled surgery time. Please be ready to share:  Your doctor's clinic name and phone number  Your medical, surgical, and anesthesia history  A list of allergies and sensitivities  A list of medicines, including herbal treatments and over-the-counter drugs  Whether the patient has a legal guardian (ask how to send us the papers in advance)  Please tell us if you're pregnant--or if there's any chance you might be pregnant. Some surgeries may injure a fetus (unborn baby), so they require a pregnancy test. Surgeries that are safe for a fetus don't always need a test, and you can choose whether to have one.   If you have a child who's having surgery, please ask for a copy of Preparing for Your Child's Surgery.    Preparing for surgery  Within 10 to 30 days of surgery: Have a pre-op exam (sometimes called an H&P, or History and Physical). This can be done at a clinic or pre-operative center.  If you're having a , you may not need this exam. Talk to your care team.  At your pre-op exam, talk to your care team about all medicines you take. If you need to stop any medicines before surgery, ask when to start taking them again.  We do this for your safety. Many medicines can make you bleed too much during surgery. Some change how well surgery (anesthesia) drugs work.  Call your insurance company to let them know you're having surgery. (If you don't have insurance, call [phone].)  Call your clinic if there's any change in your health. This includes signs of a cold or flu (sore throat, runny nose, cough, rash, fever). It also includes a scrape or scratch near the surgery site.  If you have questions on the day of surgery, call your hospital or surgery center.  Eating and drinking guidelines  For your safety: Unless your surgeon tells you otherwise,  follow the guidelines below.  Eat and drink as usual until 8 hours before you arrive for surgery. After that, no food or milk.  Drink clear liquids until 2 hours before you arrive. These are liquids you can see through, like water, Gatorade, and Propel Water. They also include plain black coffee and tea (no cream or milk), candy, and breath mints. You can spit out gum when you arrive.  If you drink alcohol: Stop drinking it the night before surgery.  If your care team tells you to take medicine on the morning of surgery, it's okay to take it with a sip of water.  Preventing infection  Shower or bathe the night before and morning of your surgery. Follow the instructions your clinic gave you. (If no instructions, use regular soap.)  Don't shave or clip hair near your surgery site. We'll remove the hair if needed.  Don't smoke or vape the morning of surgery. You may chew nicotine gum up to 2 hours before surgery. A nicotine patch is okay.  Note: Some surgeries require you to completely quit smoking and nicotine. Check with your surgeon.  Your care team will make every effort to keep you safe from infection. We will:  Clean our hands often with soap and water (or an alcohol-based hand rub).  Clean the skin at your surgery site with a special soap that kills germs.  Give you a special gown to keep you warm. (Cold raises the risk of infection.)  Wear special hair covers, masks, gowns and gloves during surgery.  Give antibiotic medicine, if prescribed. Not all surgeries need antibiotics.  What to bring on the day of surgery  Photo ID and insurance card  Copy of your health care directive, if you have one  Glasses and hearing aids (bring cases)  You can't wear contacts during surgery  Inhaler and eye drops, if you use them (tell us about these when you arrive)  CPAP machine or breathing device, if you use them  A few personal items, if spending the night  If you have . . .  A pacemaker, ICD (cardiac defibrillator) or other  implant: Bring the ID card.  An implanted stimulator: Bring the remote control.  A legal guardian: Bring a copy of the certified (court-stamped) guardianship papers.  Please remove any jewelry, including body piercings. Leave jewelry and other valuables at home.  If you're going home the day of surgery  You must have a responsible adult drive you home. They should stay with you overnight as well.  If you don't have someone to stay with you, and you aren't safe to go home alone, we may keep you overnight. Insurance often won't pay for this.  After surgery  If it's hard to control your pain or you need more pain medicine, please call your surgeon's office.  Questions?   If you have any questions for your care team, list them here: _________________________________________________________________________________________________________________________________________________________________________ ____________________________________ ____________________________________ ____________________________________  For informational purposes only. Not to replace the advice of your health care provider. Copyright   2003, 2019 [employer] [facility]. All rights reserved. Clinically reviewed by Jennifer Boyle MD. [employer] [id number] - REV 12/22.    How to Take Your Medication Before Surgery  - Take all of your medications before surgery as usual

## 2024-05-14 NOTE — PROGRESS NOTES
Preoperative Evaluation  [facility]  [address]  [address]  [facility] [address]  Phone: [phone]  Fax: [phone]  Primary Provider: Bertrand Sanchez  Pre-op Performing Provider: BERTRAND SANCHEZ  May 14, 2024       Kiran is a 43 year old, presenting for the following:    Pre-Op Exam (05/24 [facility] Inspire Hypoglossal Nerve Stimulator Placement Dr Hugo Montague)        5/14/2024     9:14 AM   Additional Questions   Roomed by Liss         5/14/2024   Forms   Any forms needing to be completed Yes     Surgical Information  Surgery/Procedure: Inspire Hypoglossal Nerve Stimulator Placement  Surgery Location: [facility]  Surgeon: Dr Niño  Surgery Date: 05/24/24  Time of Surgery: AM  Where patient plans to recover: At home with family  Fax number for surgical facility: [phone]    Assessment & Plan     The proposed surgical procedure is considered LOW risk.    Preop general physical exam  Cleared for anesthesia.     Obstructive sleep apnea syndrome  Per ENT.    Chronic insomnia  Refilled today.  - amitriptyline (ELAVIL) 50 MG tablet; Take 2-3 tablets (100-150 mg) by mouth at bedtime    Essential hypertension  Under control on no current medications.    Right inguinal hernia  Noted on exam today.  Indications for seeking urgent/emergent care reviewed.  Referred to surgery.  - Adult General Surg Referral; Future      Possible Sleep Apnea: noted           - No identified additional risk factors other than previously addressed    Antiplatelet or Anticoagulation Medication Instructions   - Patient is on no antiplatelet or anticoagulation medications.    Additional Medication Instructions  Patient is on no additional chronic medications    Recommendation  APPROVAL GIVEN to proceed with proposed procedure, without further diagnostic evaluation.    Review of prior external note(s) from - CareEverywhere information from  [facility] reviewed      Subjective       HPI related to upcoming procedure: sleep apnea and chronic insomnia not responsive to CPAP or dental appliance        5/13/2024    10:15 PM   Preop Questions   1. Have you ever had a heart attack or stroke? No   2. Have you ever had surgery on your heart or blood vessels, such as a stent placement, a coronary artery bypass, or surgery on an artery in your head, neck, heart, or legs? No   3. Do you have chest pain with activity? No   4. Do you have a history of  heart failure? No   5. Do you currently have a cold, bronchitis or symptoms of other infection? No   6. Do you have a cough, shortness of breath, or wheezing? No   7. Do you or anyone in your family have previous history of blood clots? No   8. Do you or does anyone in your family have a serious bleeding problem such as prolonged bleeding following surgeries or cuts? No   9. Have you ever had problems with anemia or been told to take iron pills? No   10. Have you had any abnormal blood loss such as black, tarry or bloody stools? No   11. Have you ever had a blood transfusion? No   12. Are you willing to have a blood transfusion if it is medically needed before, during, or after your surgery? Yes   13. Have you or any of your relatives ever had problems with anesthesia? No   14. Do you have sleep apnea, excessive snoring or daytime drowsiness? YES - per procedure   14a. Do you have a CPAP machine? No   15. Do you have any artifical heart valves or other implanted medical devices like a pacemaker, defibrillator, or continuous glucose monitor? No   16. Do you have artificial joints? No   17. Are you allergic to latex? No       Health Care Directive  Patient does not have a Health Care Directive or Living Will: Discussed advance care planning with patient; information given to patient to review.    Preoperative Review of    reviewed - no record of controlled substances prescribed.      Status of Chronic Conditions:  See  problem list for active medical problems.  Problems all longstanding and stable, except as noted/documented.  See ROS for pertinent symptoms related to these conditions.    Patient Active Problem List    Diagnosis Date Noted    Cubital tunnel syndrome of both upper extremities 2022     Priority: Medium    Essential hypertension 2020     Priority: Medium    Chronic insomnia 10/01/2012     Priority: Medium      Past Medical History:   Diagnosis Date    Closed fracture of left tibia and fibula with routine healing     Depressive disorder     Hypertension     Pneumonia due to  novel coronavirus 2021    Pneumonia due to 2019 novel coronavirus 4/10/2021     Past Surgical History:   Procedure Laterality Date    NO HISTORY OF SURGERY       Current Outpatient Medications   Medication Sig Dispense Refill    amitriptyline (ELAVIL) 50 MG tablet Take 2-3 tablets (100-150 mg) by mouth at bedtime 90 tablet 2    Vitamin D3 (CHOLECALCIFEROL) 125 MCG (5000 UT) tablet Take 1 tablet by mouth daily      fish oil-omega-3 fatty acids 1000 MG capsule Take 2 g by mouth daily (Patient not taking: Reported on 2024)         No Known Allergies     Social History     Tobacco Use    Smoking status: Former     Current packs/day: 0.00     Average packs/day: 0.5 packs/day for 10.0 years (5.0 ttl pk-yrs)     Types: Cigarettes     Start date: 1996     Quit date: 2006     Years since quittin.3     Passive exposure: Past    Smokeless tobacco: Never   Substance Use Topics    Alcohol use: Yes     Comment: 0-few drinks per month     Family History   Problem Relation Age of Onset    Hypertension Father     Depression Sister     Depression Sister      History   Drug Use Unknown         Review of Systems    Review of Systems  CONSTITUTIONAL: NEGATIVE for fever, chills, change in weight  INTEGUMENTARY/SKIN: NEGATIVE for worrisome rashes, moles or lesions  EYES: NEGATIVE for vision changes or irritation  ENT/MOUTH:  "NEGATIVE for ear, mouth and throat problems  RESP: NEGATIVE for significant cough or SOB  BREAST: NEGATIVE for masses, tenderness or discharge  CV: NEGATIVE for chest pain, palpitations or peripheral edema  GI: NEGATIVE for nausea, abdominal pain, heartburn, or change in bowel habits  : NEGATIVE for frequency, dysuria, or hematuria  MUSCULOSKELETAL: NEGATIVE for significant arthralgias or myalgia  NEURO: NEGATIVE for weakness, dizziness or paresthesias  ENDOCRINE: NEGATIVE for temperature intolerance, skin/hair changes  HEME: NEGATIVE for bleeding problems  PSYCHIATRIC: NEGATIVE for changes in mood or affect    Objective    BP (!) 142/90 (BP Location: Left arm, Patient Position: Sitting, Cuff Size: Adult Regular)   Pulse 83   Temp 98  F (36.7  C) (Temporal)   Resp 18   Ht 1.807 m (5' 11.14\")   Wt 94.8 kg (209 lb)   SpO2 97%   BMI 29.03 kg/m     Estimated body mass index is 29.03 kg/m  as calculated from the following:    Height as of this encounter: 1.807 m (5' 11.14\").    Weight as of this encounter: 94.8 kg (209 lb).  Physical Exam  GENERAL: alert and no distress  EYES: Eyes grossly normal to inspection, PERRL and conjunctivae and sclerae normal  HENT: ear canals and TM's normal, nose and mouth without ulcers or lesions  NECK: no adenopathy, no asymmetry, masses, or scars  RESP: lungs clear to auscultation - no rales, rhonchi or wheezes  CV: regular rate and rhythm, normal S1 S2, no S3 or S4, no murmur, click or rub, no peripheral edema  ABDOMEN: soft, nontender, no hepatosplenomegaly, no masses and bowel sounds normal   (male): testicles normal without atrophy or masses, hernia right inguinal hernia, and penis normal without urethral discharge  MS: no gross musculoskeletal defects noted, no edema  SKIN: no suspicious lesions or rashes  NEURO: Normal strength and tone, mentation intact and speech normal  PSYCH: mentation appears normal, affect normal/bright    Recent Labs   Lab Test 02/26/24  1415   HGB " 14.8         POTASSIUM 4.0   CR 0.97   A1C 5.5        Diagnostics  No labs were ordered during this visit.   No EKG required for low risk surgery (cataract, skin procedure, breast biopsy, etc).    Revised Cardiac Risk Index (RCRI)  The patient has the following serious cardiovascular risks for perioperative complications:   - No serious cardiac risks = 0 points     RCRI Interpretation: 0 points: Class I (very low risk - 0.4% complication rate)         Signed Electronically by: Bertrand Sanchez MD  Copy of this evaluation report is provided to requesting physician.

## 2024-06-04 ENCOUNTER — OFFICE VISIT (OUTPATIENT)
Dept: SURGERY | Facility: CLINIC | Age: 44
End: 2024-06-04
Payer: COMMERCIAL

## 2024-06-04 VITALS
WEIGHT: 209 LBS | TEMPERATURE: 96.1 F | HEART RATE: 97 BPM | HEIGHT: 71 IN | BODY MASS INDEX: 29.26 KG/M2 | DIASTOLIC BLOOD PRESSURE: 99 MMHG | SYSTOLIC BLOOD PRESSURE: 140 MMHG

## 2024-06-04 DIAGNOSIS — K40.90 REDUCIBLE RIGHT INGUINAL HERNIA: Primary | ICD-10-CM

## 2024-06-04 PROCEDURE — 99243 OFF/OP CNSLTJ NEW/EST LOW 30: CPT | Performed by: SURGERY

## 2024-06-04 ASSESSMENT — PAIN SCALES - GENERAL: PAINLEVEL: MILD PAIN (2)

## 2024-06-04 NOTE — NURSING NOTE
"Initial BP (!) 140/99 (BP Location: Right arm, Patient Position: Sitting, Cuff Size: Adult Regular)   Pulse 97   Temp (!) 96.1  F (35.6  C) (Tympanic)   Ht 1.807 m (5' 11.14\")   Wt 94.8 kg (208 lb 15.9 oz)   BMI 29.03 kg/m   Estimated body mass index is 29.03 kg/m  as calculated from the following:    Height as of this encounter: 1.807 m (5' 11.14\").    Weight as of this encounter: 94.8 kg (208 lb 15.9 oz). .  Ilana Shepard MA    "

## 2024-06-04 NOTE — LETTER
2024         RE: Roger Law  [address]  [place] [address]        Dear Colleague,    Thank you for referring your patient, Roger Law, to the [facility]. Please see a copy of my visit note below.    Surgical Consultation/History and Physical  [facility]    Roger is seen in consultation for Hernia, at the request of Bertrand Sacnhez MD.    Chief Complaint:  Hernia    History of Present Illness: Roger Law is a 43 year old male presents with hernia.  Noted bulge in right groin over past 5 months.  It is increasing in size and occasional causes discomfort.  It becomes less pronounced when lying.  Denies testicular pain, hematuria, penile discharge.  He limits heavy lifting.  Denies family history of hernias, born full term.    Patient Active Problem List   Diagnosis     Chronic insomnia     Essential hypertension     Cubital tunnel syndrome of both upper extremities     Past Medical History:   Diagnosis Date     Closed fracture of left tibia and fibula with routine healing      Depressive disorder      Hypertension      Pneumonia due to  novel coronavirus 2021     Pneumonia due to 2019 novel coronavirus 4/10/2021     Past Surgical History:   Procedure Laterality Date     NO HISTORY OF SURGERY       Family History   Problem Relation Age of Onset     Hypertension Father      Depression Sister      Depression Sister      Social History     Tobacco Use     Smoking status: Former     Current packs/day: 0.00     Average packs/day: 0.5 packs/day for 10.0 years (5.0 ttl pk-yrs)     Types: Cigarettes     Start date: 1996     Quit date: 2006     Years since quittin.4     Passive exposure: Past     Smokeless tobacco: Never   Substance Use Topics     Alcohol use: Yes     Comment: 0-few drinks per month      History   Drug Use Unknown     Current Outpatient Medications   Medication Sig Dispense Refill      "amitriptyline (ELAVIL) 50 MG tablet Take 2-3 tablets (100-150 mg) by mouth at bedtime 90 tablet 2     Vitamin D3 (CHOLECALCIFEROL) 125 MCG (5000 UT) tablet Take 1 tablet by mouth daily       fish oil-omega-3 fatty acids 1000 MG capsule Take 2 g by mouth daily (Patient not taking: Reported on 2/26/2024)       No Known Allergies    Review of Systems:   5 point ROS otherwise negative    Physical Exam:  BP (!) 140/99 (BP Location: Right arm, Patient Position: Sitting, Cuff Size: Adult Regular)   Pulse 97   Temp (!) 96.1  F (35.6  C) (Tympanic)   Ht 1.807 m (5' 11.14\")   Wt 94.8 kg (208 lb 15.9 oz)   BMI 29.03 kg/m      Constitutional- No acute distress, well nourished, non-toxic  Eyes: Anicteric, no injection.  PERRL  ENT:  Normocephalic, atraumatic, Nose midline, moist mucus membranes  Neck - supple, no LAD  Respiratory- Clear to auscultation bilaterally, good inspiratory effort  Cardiovascular - No peripheral edema.  No clubbing.  Abdomen - Soft, non-tender, +BS, no hepatosplenomegaly, no palpable masses  Groins - Easily reducible right inguinal hernia.  No overlying skin changes.  No left inguinal hernia  Neuro - No focal neuro deficits, Alert and oriented x 3  Psych: Appropriate mood and affect  Musculoskeletal: Normal gait, symmetric strength.  FROM upper and lower extremities.  Skin: Warm, Dry    Assessment:  1. Reducible right inguinal hernia      Plan:   Roger Law presents with  asymptomatic  reducible right inguinal hernia. Symptoms are stable. The patient may benefit from either open or robotic right inguinal hernia repair with mesh, and the indications, risks, benefits and alternatives to surgery were discussed in detail, and the patient understood the counseling offered.  As he has minimal symptoms, he wishes to hold off on any surgical intervention.  I advised him of signs/symptoms of intestinal involvement and need for prompt evaluation.  All questions answered to best of my ability.  SHRUTHI " guide reviewed in detail.        Ra Ashford DO on 6/4/2024 at 7:26 AM      Again, thank you for allowing me to participate in the care of your patient.        Sincerely,        Ra Ashford DO

## 2024-06-04 NOTE — PROGRESS NOTES
Surgical Consultation/History and Physical  [facility]    Roger is seen in consultation for Hernia, at the request of Bertrand Sanchez MD.    Chief Complaint:  Hernia    History of Present Illness: Roger Law is a 43 year old male presents with hernia.  Noted bulge in right groin over past 5 months.  It is increasing in size and occasional causes discomfort.  It becomes less pronounced when lying.  Denies testicular pain, hematuria, penile discharge.  He limits heavy lifting.  Denies family history of hernias, born full term.    Patient Active Problem List   Diagnosis    Chronic insomnia    Essential hypertension    Cubital tunnel syndrome of both upper extremities     Past Medical History:   Diagnosis Date    Closed fracture of left tibia and fibula with routine healing     Depressive disorder     Hypertension     Pneumonia due to 2019 novel coronavirus 2021    Pneumonia due to 2019 novel coronavirus 4/10/2021     Past Surgical History:   Procedure Laterality Date    NO HISTORY OF SURGERY       Family History   Problem Relation Age of Onset    Hypertension Father     Depression Sister     Depression Sister      Social History     Tobacco Use    Smoking status: Former     Current packs/day: 0.00     Average packs/day: 0.5 packs/day for 10.0 years (5.0 ttl pk-yrs)     Types: Cigarettes     Start date: 1996     Quit date: 2006     Years since quittin.4     Passive exposure: Past    Smokeless tobacco: Never   Substance Use Topics    Alcohol use: Yes     Comment: 0-few drinks per month      History   Drug Use Unknown     Current Outpatient Medications   Medication Sig Dispense Refill    amitriptyline (ELAVIL) 50 MG tablet Take 2-3 tablets (100-150 mg) by mouth at bedtime 90 tablet 2    Vitamin D3 (CHOLECALCIFEROL) 125 MCG (5000 UT) tablet Take 1 tablet by mouth daily      fish oil-omega-3 fatty acids 1000 MG capsule Take 2 g by mouth daily (Patient not taking:  "Reported on 2/26/2024)       No Known Allergies    Review of Systems:   5 point ROS otherwise negative    Physical Exam:  BP (!) 140/99 (BP Location: Right arm, Patient Position: Sitting, Cuff Size: Adult Regular)   Pulse 97   Temp (!) 96.1  F (35.6  C) (Tympanic)   Ht 1.807 m (5' 11.14\")   Wt 94.8 kg (208 lb 15.9 oz)   BMI 29.03 kg/m      Constitutional- No acute distress, well nourished, non-toxic  Eyes: Anicteric, no injection.  PERRL  ENT:  Normocephalic, atraumatic, Nose midline, moist mucus membranes  Neck - supple, no LAD  Respiratory- Clear to auscultation bilaterally, good inspiratory effort  Cardiovascular - No peripheral edema.  No clubbing.  Abdomen - Soft, non-tender, +BS, no hepatosplenomegaly, no palpable masses  Groins - Easily reducible right inguinal hernia.  No overlying skin changes.  No left inguinal hernia  Neuro - No focal neuro deficits, Alert and oriented x 3  Psych: Appropriate mood and affect  Musculoskeletal: Normal gait, symmetric strength.  FROM upper and lower extremities.  Skin: Warm, Dry    Assessment:  1. Reducible right inguinal hernia      Plan:   Roger Law presents with  asymptomatic  reducible right inguinal hernia. Symptoms are stable. The patient may benefit from either open or robotic right inguinal hernia repair with mesh, and the indications, risks, benefits and alternatives to surgery were discussed in detail, and the patient understood the counseling offered.  As he has minimal symptoms, he wishes to hold off on any surgical intervention.  I advised him of signs/symptoms of intestinal involvement and need for prompt evaluation.  All questions answered to best of my ability.  SHRUTHI alatorre reviewed in detail.        Ra Ashford, DO on 6/4/2024 at 7:26 AM    "

## 2024-06-06 ENCOUNTER — TELEPHONE (OUTPATIENT)
Dept: SURGERY | Facility: CLINIC | Age: 44
End: 2024-06-06
Payer: COMMERCIAL

## 2024-06-06 ENCOUNTER — TELEPHONE (OUTPATIENT)
Dept: SURGERY | Facility: CLINIC | Age: 44
End: 2024-06-06

## 2024-06-06 DIAGNOSIS — K40.90 REDUCIBLE RIGHT INGUINAL HERNIA: Primary | ICD-10-CM

## 2024-06-06 NOTE — TELEPHONE ENCOUNTER
Patient called and states he would like to move forward with hernia surgery. Please place orders. Thank you.

## 2024-06-06 NOTE — TELEPHONE ENCOUNTER
Type of surgery: Robotic Right Inguinal Hernia Repair with Mesh, possible bilateral   Location of surgery: [place] [facility]

## 2024-06-06 NOTE — TELEPHONE ENCOUNTER
Left message for patient to call us back to discuss surgery dates    Nikki Curtis M.A.  Specialty surgery Scheduler

## 2024-06-12 ENCOUNTER — TELEPHONE (OUTPATIENT)
Dept: SURGERY | Facility: CLINIC | Age: 44
End: 2024-06-12
Payer: COMMERCIAL

## 2024-06-12 NOTE — TELEPHONE ENCOUNTER
Type of surgery: Robotic Right Inguinal Hernia Repair with Mesh, possible bilateral   Location of surgery: [place] [facility]  Date and time of surgery: 7-12-24  Surgeon: Makeda  Pre-Op Appt Date: 7-1-24  Post-Op Appt Date: 7-30-24   Packet sent out: Yes  Pre-cert/Authorization completed:    Date:

## 2024-06-12 NOTE — TELEPHONE ENCOUNTER
Patient is scheduled for surgery on 7-12-24 and would like to know recovery timeline post surgery. Please call to discuss, thank you.

## 2024-07-01 ENCOUNTER — OFFICE VISIT (OUTPATIENT)
Dept: FAMILY MEDICINE | Facility: CLINIC | Age: 44
End: 2024-07-01
Payer: COMMERCIAL

## 2024-07-01 VITALS
TEMPERATURE: 98.4 F | HEIGHT: 71 IN | HEART RATE: 105 BPM | BODY MASS INDEX: 30.52 KG/M2 | WEIGHT: 218 LBS | SYSTOLIC BLOOD PRESSURE: 124 MMHG | RESPIRATION RATE: 18 BRPM | OXYGEN SATURATION: 97 % | DIASTOLIC BLOOD PRESSURE: 92 MMHG

## 2024-07-01 DIAGNOSIS — F51.04 CHRONIC INSOMNIA: ICD-10-CM

## 2024-07-01 DIAGNOSIS — Z01.818 PREOP GENERAL PHYSICAL EXAM: Primary | ICD-10-CM

## 2024-07-01 DIAGNOSIS — I10 ESSENTIAL HYPERTENSION: ICD-10-CM

## 2024-07-01 DIAGNOSIS — K40.90 RIGHT INGUINAL HERNIA: ICD-10-CM

## 2024-07-01 PROCEDURE — 99214 OFFICE O/P EST MOD 30 MIN: CPT | Performed by: FAMILY MEDICINE

## 2024-07-01 ASSESSMENT — PAIN SCALES - GENERAL: PAINLEVEL: NO PAIN (0)

## 2024-07-01 NOTE — PATIENT INSTRUCTIONS

## 2024-07-01 NOTE — PROGRESS NOTES
Preoperative Evaluation  [facility]  [address]  [address]  [facility] [address]  Phone: [phone]  Fax: [phone]  Primary Provider: Bertrand Sanchez MD  Pre-op Performing Provider: Bertrand Sanchez MD  Jul 1, 2024 6/26/2024   Surgical Information   What procedure is being done? Hernia sugery   Facility or Hospital where procedure/surgery will be performed: [facility]   Who is doing the procedure / surgery? Dr. Ra Ashford   Date of surgery / procedure: 7/12/24   Time of surgery / procedure: morning   Where do you plan to recover after surgery? at home with family        Fax number for surgical facility: Note does not need to be faxed, will be available electronically in Epic.    Assessment & Plan     The proposed surgical procedure is considered LOW risk.    Preop general physical exam  Patient is felt to be an acceptable candidate for anesthesia without further testing today.    Right inguinal hernia  Per general surgery.    Essential hypertension  Blood pressure improved on recheck but still little bit elevated so recommend continuing to monitor at home.    Chronic insomnia  The take amitriptyline prior to surgery the evening before for sleep induction.      Possible Sleep Apnea: Recently had inspire pacemaker device implanted for treatment of obstructive sleep apnea and is currently in the process of adjusting with the specialist.            - No identified additional risk factors other than previously addressed    Preoperative Medication Instructions  Antiplatelet or Anticoagulation Medication Instructions   - Patient is on no antiplatelet or anticoagulation medications.    Additional Medication Instructions  Take all scheduled medications on the day of surgery    Recommendation  Approval given to proceed with proposed procedure, without further diagnostic evaluation.    Evaristo Beck is a 43 year old, presenting for  the following:  Pre-Op Exam          7/1/2024    10:31 AM   Additional Questions   Roomed by Georgina ASTUDILLO related to upcoming procedure: Symptomatic right inguinal hernia        6/26/2024   Pre-Op Questionnaire   Have you ever had a heart attack or stroke? No   Have you ever had surgery on your heart or blood vessels, such as a stent placement, a coronary artery bypass, or surgery on an artery in your head, neck, heart, or legs? No   Do you have chest pain with activity? No   Do you have a history of heart failure? No   Do you currently have a cold, bronchitis or symptoms of other infection? No   Do you have a cough, shortness of breath, or wheezing? No   Do you or anyone in your family have previous history of blood clots? No   Do you or does anyone in your family have a serious bleeding problem such as prolonged bleeding following surgeries or cuts? No   Have you ever had problems with anemia or been told to take iron pills? No   Have you had any abnormal blood loss such as black, tarry or bloody stools? No   Have you ever had a blood transfusion? No   Are you willing to have a blood transfusion if it is medically needed before, during, or after your surgery? Yes   Have you or any of your relatives ever had problems with anesthesia? No   Do you have sleep apnea, excessive snoring or daytime drowsiness? (!) YES   Do you have a CPAP machine? (!) NO Newly implanted Inspire   Do you have any artifical heart valves or other implanted medical devices like a pacemaker, defibrillator, or continuous glucose monitor? No   Do you have artificial joints? No   Are you allergic to latex? No        Health Care Directive  Patient does not have a Health Care Directive or Living Will: Discussed advance care planning with patient; information given to patient to review.    Preoperative Review of    reviewed - short course of oxycodone after Inspire implant surgery      Status of Chronic Conditions:  See problem list for  active medical problems.  Problems all longstanding and stable, except as noted/documented.  See ROS for pertinent symptoms related to these conditions.    Patient Active Problem List    Diagnosis Date Noted    Cubital tunnel syndrome of both upper extremities 2022     Priority: Medium    Essential hypertension 2020     Priority: Medium    Chronic insomnia 10/01/2012     Priority: Medium      Past Medical History:   Diagnosis Date    Closed fracture of left tibia and fibula with routine healing     Depressive disorder     Hypertension     Pneumonia due to  novel coronavirus 2021    Pneumonia due to  novel coronavirus 4/10/2021     Past Surgical History:   Procedure Laterality Date    MOUTH SURGERY  2024    Inspire implant for APOLINAR     Current Outpatient Medications   Medication Sig Dispense Refill    amitriptyline (ELAVIL) 50 MG tablet Take 2-3 tablets (100-150 mg) by mouth at bedtime 90 tablet 2    fish oil-omega-3 fatty acids 1000 MG capsule Take 2 g by mouth daily      Vitamin D3 (CHOLECALCIFEROL) 125 MCG (5000 UT) tablet Take 1 tablet by mouth daily         No Known Allergies     Social History     Tobacco Use    Smoking status: Former     Current packs/day: 0.00     Average packs/day: 0.5 packs/day for 10.0 years (5.0 ttl pk-yrs)     Types: Cigarettes     Start date: 1996     Quit date: 2006     Years since quittin.4     Passive exposure: Past    Smokeless tobacco: Never   Substance Use Topics    Alcohol use: Yes     Comment: 0-few drinks per month     Family History   Problem Relation Age of Onset    Hypertension Father     Depression Sister     Depression Sister      History   Drug Use Unknown             Review of Systems  CONSTITUTIONAL: NEGATIVE for fever, chills, change in weight  INTEGUMENTARY/SKIN: NEGATIVE for worrisome rashes, moles or lesions  EYES: NEGATIVE for vision changes or irritation  ENT/MOUTH: NEGATIVE for ear, mouth and throat problems  RESP: NEGATIVE  "for significant cough or SOB  BREAST: NEGATIVE for masses, tenderness or discharge  CV: NEGATIVE for chest pain, palpitations or peripheral edema  GI: NEGATIVE for nausea, abdominal pain, heartburn, or change in bowel habits  : NEGATIVE for frequency, dysuria, or hematuria  MUSCULOSKELETAL: NEGATIVE for significant arthralgias or myalgia  NEURO: NEGATIVE for weakness, dizziness or paresthesias  ENDOCRINE: NEGATIVE for temperature intolerance, skin/hair changes  HEME: NEGATIVE for bleeding problems  PSYCHIATRIC: NEGATIVE for changes in mood or affect    Objective    BP (!) 124/92 (BP Location: Right arm, Patient Position: Sitting, Cuff Size: Adult Large)   Pulse 105   Temp 98.4  F (36.9  C) (Temporal)   Resp 18   Ht 1.816 m (5' 11.5\")   Wt 98.9 kg (218 lb)   SpO2 97%   BMI 29.98 kg/m     Estimated body mass index is 29.98 kg/m  as calculated from the following:    Height as of this encounter: 1.816 m (5' 11.5\").    Weight as of this encounter: 98.9 kg (218 lb).  Physical Exam  GENERAL: alert and no distress  EYES: Eyes grossly normal to inspection, PERRL and conjunctivae and sclerae normal  HENT: ear canals and TM's normal, nose and mouth without ulcers or lesions  NECK: no adenopathy, no asymmetry, masses, or scars  RESP: lungs clear to auscultation - no rales, rhonchi or wheezes  CV: regular rate and rhythm, normal S1 S2, no S3 or S4, no murmur, click or rub, no peripheral edema  ABDOMEN: soft, nontender, no hepatosplenomegaly, no masses and bowel sounds normal  MS: no gross musculoskeletal defects noted, no edema  SKIN: no suspicious lesions or rashes  NEURO: Normal strength and tone, mentation intact and speech normal  PSYCH: mentation appears normal, affect normal/bright    Recent Labs   Lab Test 02/26/24  1415   HGB 14.8         POTASSIUM 4.0   CR 0.97   A1C 5.5        Diagnostics  No labs were ordered during this visit.   No EKG required for low risk surgery (cataract, skin procedure, " breast biopsy, etc).    Revised Cardiac Risk Index (RCRI)  The patient has the following serious cardiovascular risks for perioperative complications:   - No serious cardiac risks = 0 points     RCRI Interpretation: 0 points: Class I (very low risk - 0.4% complication rate)         Signed Electronically by: Bertrand Sanchez MD  Copy of this evaluation report is provided to requesting physician.

## 2024-07-11 ENCOUNTER — ANESTHESIA EVENT (OUTPATIENT)
Dept: SURGERY | Facility: CLINIC | Age: 44
End: 2024-07-11
Payer: COMMERCIAL

## 2024-07-11 ASSESSMENT — LIFESTYLE VARIABLES: TOBACCO_USE: 1

## 2024-07-11 NOTE — ANESTHESIA PREPROCEDURE EVALUATION
Anesthesia Pre-Procedure Evaluation    Patient: Roger Law   MRN: [id number] : 1980        Procedure : Procedure(s):  Robotic Right Inguinal Hernia Repair with Mesh, possible bilateral          Past Medical History:   Diagnosis Date    Closed fracture of left tibia and fibula with routine healing     Depressive disorder     Hypertension     Pneumonia due to 2019 novel coronavirus 2021    Pneumonia due to 2019 novel coronavirus 4/10/2021      Past Surgical History:   Procedure Laterality Date    MOUTH SURGERY  2024    Inspire implant for APOLINAR      No Known Allergies   Social History     Tobacco Use    Smoking status: Former     Current packs/day: 0.00     Average packs/day: 0.5 packs/day for 10.0 years (5.0 ttl pk-yrs)     Types: Cigarettes     Start date: 1996     Quit date: 2006     Years since quittin.5     Passive exposure: Past    Smokeless tobacco: Never   Substance Use Topics    Alcohol use: Yes     Comment: 0-few drinks per month      Wt Readings from Last 1 Encounters:   24 98.9 kg (218 lb)        Anesthesia Evaluation   Pt has had prior anesthetic.         ROS/MED HX  ENT/Pulmonary:     (+) sleep apnea,    APOLINAR risk factors,  hypertension,  daytime somnolence,       tobacco use, Past use,  5  Pack-Year Hx,                      Neurologic: Comment: insomnia      Cardiovascular:     (+)  hypertension- -   -  - -                                      METS/Exercise Tolerance:     Hematologic:       Musculoskeletal:       GI/Hepatic:       Renal/Genitourinary:       Endo:       Psychiatric/Substance Use:     (+) psychiatric history depression       Infectious Disease:       Malignancy:       Other:            Physical Exam    Airway        Mallampati: II   TM distance: > 3 FB   Neck ROM: full   Mouth opening: > 3 cm    Respiratory Devices and Support         Dental       (+) Minor Abnormalities - some fillings, tiny chips      Cardiovascular   cardiovascular exam normal      "  Rhythm and rate: regular and normal     Pulmonary   pulmonary exam normal        breath sounds clear to auscultation           OUTSIDE LABS:  CBC:   Lab Results   Component Value Date    WBC 5.8 02/26/2024    WBC 5.8 02/23/2022    HGB 14.8 02/26/2024    HGB 15.7 02/23/2022    HCT 43.2 02/26/2024    HCT 46.4 02/23/2022     02/26/2024     02/23/2022     BMP:   Lab Results   Component Value Date     02/26/2024     02/23/2022    POTASSIUM 4.0 02/26/2024    POTASSIUM 4.3 02/23/2022    CHLORIDE 107 02/26/2024    CHLORIDE 105 02/23/2022    CO2 23 02/26/2024    CO2 27 02/23/2022    BUN 9.5 02/26/2024    BUN 11 02/23/2022    CR 0.97 02/26/2024    CR 0.79 02/23/2022    GLC 92 02/26/2024    GLC 95 02/23/2022     COAGS:   Lab Results   Component Value Date    FIBR 427 (H) 04/13/2021     POC: No results found for: \"BGM\", \"HCG\", \"HCGS\"  HEPATIC:   Lab Results   Component Value Date    ALBUMIN 4.4 02/26/2024    PROTTOTAL 6.7 02/26/2024    ALT 46 02/26/2024    AST 29 02/26/2024    ALKPHOS 78 02/26/2024    BILITOTAL 0.4 02/26/2024     OTHER:   Lab Results   Component Value Date    A1C 5.5 02/26/2024    SIA 9.1 02/26/2024    CRP 14.0 (H) 04/13/2021       Anesthesia Plan    ASA Status:  2    NPO Status:  NPO Appropriate    Anesthesia Type: General.     - Airway: ETT   Induction: Propofol, Intravenous.   Maintenance: TIVA.        Consents    Anesthesia Plan(s) and associated risks, benefits, and realistic alternatives discussed. Questions answered and patient/representative(s) expressed understanding.     - Discussed: Risks, Benefits and Alternatives for BOTH SEDATION and the PROCEDURE were discussed     - Discussed with:  Patient            Postoperative Care    Pain management: IV analgesics, Oral pain medications, Multi-modal analgesia.   PONV prophylaxis: Background Propofol Infusion, Ondansetron (or other 5HT-3), Dexamethasone or Solumedrol     Comments:               JUJU Grullon CRNA    I have " "reviewed the pertinent notes and labs in the chart from the past 30 days and (re)examined the patient.  Any updates or changes from those notes are reflected in this note.              # Overweight: Estimated body mass index is 29.98 kg/m  as calculated from the following:    Height as of 7/1/24: 1.816 m (5' 11.5\").    Weight as of 7/1/24: 98.9 kg (218 lb).      "

## 2024-07-12 ENCOUNTER — ANESTHESIA (OUTPATIENT)
Dept: SURGERY | Facility: CLINIC | Age: 44
End: 2024-07-12
Payer: COMMERCIAL

## 2024-07-12 ENCOUNTER — HOSPITAL ENCOUNTER (OUTPATIENT)
Facility: CLINIC | Age: 44
Discharge: HOME | End: 2024-07-12
Attending: SURGERY | Admitting: SURGERY
Payer: COMMERCIAL

## 2024-07-12 VITALS
DIASTOLIC BLOOD PRESSURE: 86 MMHG | SYSTOLIC BLOOD PRESSURE: 129 MMHG | OXYGEN SATURATION: 95 % | RESPIRATION RATE: 18 BRPM | HEART RATE: 94 BPM | HEIGHT: 72 IN | TEMPERATURE: 97.8 F | WEIGHT: 218 LBS | BODY MASS INDEX: 29.53 KG/M2

## 2024-07-12 DIAGNOSIS — K40.90 REDUCIBLE RIGHT INGUINAL HERNIA: Primary | ICD-10-CM

## 2024-07-12 PROCEDURE — 999N000141 HC STATISTIC PRE-PROCEDURE NURSING ASSESSMENT: Performed by: SURGERY

## 2024-07-12 PROCEDURE — 250N000011 HC RX IP 250 OP 636

## 2024-07-12 PROCEDURE — 250N000009 HC RX 250

## 2024-07-12 PROCEDURE — 250N000011 HC RX IP 250 OP 636: Performed by: SURGERY

## 2024-07-12 PROCEDURE — 272N000001 HC OR GENERAL SUPPLY STERILE: Performed by: SURGERY

## 2024-07-12 PROCEDURE — 258N000003 HC RX IP 258 OP 636: Performed by: SURGERY

## 2024-07-12 PROCEDURE — 710N000009 HC RECOVERY PHASE 1, LEVEL 1, PER MIN: Performed by: SURGERY

## 2024-07-12 PROCEDURE — 710N000012 HC RECOVERY PHASE 2, PER MINUTE: Performed by: SURGERY

## 2024-07-12 PROCEDURE — 250N000009 HC RX 250: Performed by: SURGERY

## 2024-07-12 PROCEDURE — 271N000001 HC OR GENERAL SUPPLY NON-STERILE: Performed by: SURGERY

## 2024-07-12 PROCEDURE — 250N000013 HC RX MED GY IP 250 OP 250 PS 637: Performed by: SURGERY

## 2024-07-12 PROCEDURE — 258N000003 HC RX IP 258 OP 636

## 2024-07-12 PROCEDURE — 49650 LAP ING HERNIA REPAIR INIT: CPT | Mod: RT | Performed by: SURGERY

## 2024-07-12 PROCEDURE — C1781 MESH (IMPLANTABLE): HCPCS | Performed by: SURGERY

## 2024-07-12 PROCEDURE — 360N000080 HC SURGERY LEVEL 7, PER MIN: Performed by: SURGERY

## 2024-07-12 PROCEDURE — 49650 LAP ING HERNIA REPAIR INIT: CPT | Mod: RT | Performed by: PHYSICIAN ASSISTANT

## 2024-07-12 PROCEDURE — 370N000017 HC ANESTHESIA TECHNICAL FEE, PER MIN: Performed by: SURGERY

## 2024-07-12 DEVICE — ANATOMICAL MESH PRE-SHAPED MONOFILAMENT POLYPROPYLENE TEXTILE WITH MARKING;RIGHT SIDE
Type: IMPLANTABLE DEVICE | Site: GROIN | Status: FUNCTIONAL
Brand: DEXTILE

## 2024-07-12 RX ORDER — BUPIVACAINE HYDROCHLORIDE 5 MG/ML
INJECTION, SOLUTION PERINEURAL PRN
Status: DISCONTINUED | OUTPATIENT
Start: 2024-07-12 | End: 2024-07-12 | Stop reason: HOSPADM

## 2024-07-12 RX ORDER — ACETAMINOPHEN 325 MG/1
975 TABLET ORAL ONCE
Status: COMPLETED | OUTPATIENT
Start: 2024-07-12 | End: 2024-07-12

## 2024-07-12 RX ORDER — MAGNESIUM SULFATE HEPTAHYDRATE 40 MG/ML
INJECTION, SOLUTION INTRAVENOUS PRN
Status: DISCONTINUED | OUTPATIENT
Start: 2024-07-12 | End: 2024-07-12

## 2024-07-12 RX ORDER — OXYCODONE HYDROCHLORIDE 5 MG/1
5 TABLET ORAL
Status: COMPLETED | OUTPATIENT
Start: 2024-07-12 | End: 2024-07-12

## 2024-07-12 RX ORDER — SODIUM CHLORIDE, SODIUM LACTATE, POTASSIUM CHLORIDE, CALCIUM CHLORIDE 600; 310; 30; 20 MG/100ML; MG/100ML; MG/100ML; MG/100ML
INJECTION, SOLUTION INTRAVENOUS CONTINUOUS PRN
Status: DISCONTINUED | OUTPATIENT
Start: 2024-07-12 | End: 2024-07-12

## 2024-07-12 RX ORDER — ONDANSETRON 2 MG/ML
4 INJECTION INTRAMUSCULAR; INTRAVENOUS EVERY 30 MIN PRN
Status: DISCONTINUED | OUTPATIENT
Start: 2024-07-12 | End: 2024-07-12 | Stop reason: HOSPADM

## 2024-07-12 RX ORDER — LIDOCAINE HYDROCHLORIDE AND EPINEPHRINE 10; 10 MG/ML; UG/ML
INJECTION, SOLUTION INFILTRATION; PERINEURAL PRN
Status: DISCONTINUED | OUTPATIENT
Start: 2024-07-12 | End: 2024-07-12 | Stop reason: HOSPADM

## 2024-07-12 RX ORDER — ONDANSETRON 4 MG/1
4 TABLET, ORALLY DISINTEGRATING ORAL EVERY 30 MIN PRN
Status: DISCONTINUED | OUTPATIENT
Start: 2024-07-12 | End: 2024-07-12 | Stop reason: HOSPADM

## 2024-07-12 RX ORDER — CEFAZOLIN SODIUM/WATER 2 G/20 ML
SYRINGE (ML) INTRAVENOUS PRN
Status: DISCONTINUED | OUTPATIENT
Start: 2024-07-12 | End: 2024-07-12

## 2024-07-12 RX ORDER — HYDROXYZINE HYDROCHLORIDE 25 MG/1
25 TABLET, FILM COATED ORAL EVERY 6 HOURS PRN
Status: DISCONTINUED | OUTPATIENT
Start: 2024-07-12 | End: 2024-07-12 | Stop reason: HOSPADM

## 2024-07-12 RX ORDER — PROPOFOL 10 MG/ML
INJECTION, EMULSION INTRAVENOUS CONTINUOUS PRN
Status: DISCONTINUED | OUTPATIENT
Start: 2024-07-12 | End: 2024-07-12

## 2024-07-12 RX ORDER — PROPOFOL 10 MG/ML
INJECTION, EMULSION INTRAVENOUS PRN
Status: DISCONTINUED | OUTPATIENT
Start: 2024-07-12 | End: 2024-07-12

## 2024-07-12 RX ORDER — SODIUM CHLORIDE, SODIUM LACTATE, POTASSIUM CHLORIDE, CALCIUM CHLORIDE 600; 310; 30; 20 MG/100ML; MG/100ML; MG/100ML; MG/100ML
INJECTION, SOLUTION INTRAVENOUS CONTINUOUS
Status: DISCONTINUED | OUTPATIENT
Start: 2024-07-12 | End: 2024-07-12 | Stop reason: HOSPADM

## 2024-07-12 RX ORDER — NALOXONE HYDROCHLORIDE 0.4 MG/ML
0.1 INJECTION, SOLUTION INTRAMUSCULAR; INTRAVENOUS; SUBCUTANEOUS
Status: DISCONTINUED | OUTPATIENT
Start: 2024-07-12 | End: 2024-07-12 | Stop reason: HOSPADM

## 2024-07-12 RX ORDER — GABAPENTIN 300 MG/1
300 CAPSULE ORAL
Status: COMPLETED | OUTPATIENT
Start: 2024-07-12 | End: 2024-07-12

## 2024-07-12 RX ORDER — HYDROMORPHONE HCL IN WATER/PF 6 MG/30 ML
0.4 PATIENT CONTROLLED ANALGESIA SYRINGE INTRAVENOUS EVERY 5 MIN PRN
Status: DISCONTINUED | OUTPATIENT
Start: 2024-07-12 | End: 2024-07-12 | Stop reason: HOSPADM

## 2024-07-12 RX ORDER — DEXAMETHASONE SODIUM PHOSPHATE 4 MG/ML
INJECTION, SOLUTION INTRA-ARTICULAR; INTRALESIONAL; INTRAMUSCULAR; INTRAVENOUS; SOFT TISSUE PRN
Status: DISCONTINUED | OUTPATIENT
Start: 2024-07-12 | End: 2024-07-12

## 2024-07-12 RX ORDER — FENTANYL CITRATE 50 UG/ML
INJECTION, SOLUTION INTRAMUSCULAR; INTRAVENOUS PRN
Status: DISCONTINUED | OUTPATIENT
Start: 2024-07-12 | End: 2024-07-12

## 2024-07-12 RX ORDER — KETAMINE HYDROCHLORIDE 10 MG/ML
INJECTION INTRAMUSCULAR; INTRAVENOUS PRN
Status: DISCONTINUED | OUTPATIENT
Start: 2024-07-12 | End: 2024-07-12

## 2024-07-12 RX ORDER — DEXAMETHASONE SODIUM PHOSPHATE 4 MG/ML
4 INJECTION, SOLUTION INTRA-ARTICULAR; INTRALESIONAL; INTRAMUSCULAR; INTRAVENOUS; SOFT TISSUE
Status: DISCONTINUED | OUTPATIENT
Start: 2024-07-12 | End: 2024-07-12 | Stop reason: HOSPADM

## 2024-07-12 RX ORDER — LIDOCAINE 40 MG/G
CREAM TOPICAL
Status: DISCONTINUED | OUTPATIENT
Start: 2024-07-12 | End: 2024-07-12 | Stop reason: HOSPADM

## 2024-07-12 RX ORDER — HYDROMORPHONE HCL IN WATER/PF 6 MG/30 ML
0.2 PATIENT CONTROLLED ANALGESIA SYRINGE INTRAVENOUS EVERY 5 MIN PRN
Status: DISCONTINUED | OUTPATIENT
Start: 2024-07-12 | End: 2024-07-12 | Stop reason: HOSPADM

## 2024-07-12 RX ORDER — FENTANYL CITRATE 50 UG/ML
50 INJECTION, SOLUTION INTRAMUSCULAR; INTRAVENOUS EVERY 5 MIN PRN
Status: DISCONTINUED | OUTPATIENT
Start: 2024-07-12 | End: 2024-07-12 | Stop reason: HOSPADM

## 2024-07-12 RX ORDER — DOCUSATE SODIUM 100 MG/1
100 CAPSULE, LIQUID FILLED ORAL 2 TIMES DAILY
COMMUNITY
Start: 2024-07-12 | End: 2024-08-22

## 2024-07-12 RX ORDER — FENTANYL CITRATE 50 UG/ML
25 INJECTION, SOLUTION INTRAMUSCULAR; INTRAVENOUS EVERY 5 MIN PRN
Status: DISCONTINUED | OUTPATIENT
Start: 2024-07-12 | End: 2024-07-12 | Stop reason: HOSPADM

## 2024-07-12 RX ORDER — CEFAZOLIN SODIUM/WATER 2 G/20 ML
2 SYRINGE (ML) INTRAVENOUS
Status: DISCONTINUED | OUTPATIENT
Start: 2024-07-12 | End: 2024-07-12 | Stop reason: HOSPADM

## 2024-07-12 RX ORDER — CEFAZOLIN SODIUM/WATER 2 G/20 ML
2 SYRINGE (ML) INTRAVENOUS SEE ADMIN INSTRUCTIONS
Status: DISCONTINUED | OUTPATIENT
Start: 2024-07-12 | End: 2024-07-12 | Stop reason: HOSPADM

## 2024-07-12 RX ORDER — ONDANSETRON 2 MG/ML
INJECTION INTRAMUSCULAR; INTRAVENOUS PRN
Status: DISCONTINUED | OUTPATIENT
Start: 2024-07-12 | End: 2024-07-12

## 2024-07-12 RX ORDER — KETOROLAC TROMETHAMINE 30 MG/ML
INJECTION, SOLUTION INTRAMUSCULAR; INTRAVENOUS PRN
Status: DISCONTINUED | OUTPATIENT
Start: 2024-07-12 | End: 2024-07-12

## 2024-07-12 RX ORDER — OXYCODONE HYDROCHLORIDE 5 MG/1
5 TABLET ORAL EVERY 6 HOURS PRN
Qty: 12 TABLET | Refills: 0 | Status: SHIPPED | OUTPATIENT
Start: 2024-07-12 | End: 2024-07-15

## 2024-07-12 RX ORDER — LIDOCAINE HYDROCHLORIDE 20 MG/ML
INJECTION, SOLUTION INFILTRATION; PERINEURAL PRN
Status: DISCONTINUED | OUTPATIENT
Start: 2024-07-12 | End: 2024-07-12

## 2024-07-12 RX ADMIN — KETOROLAC TROMETHAMINE 30 MG: 30 INJECTION, SOLUTION INTRAMUSCULAR at 09:25

## 2024-07-12 RX ADMIN — KETAMINE HYDROCHLORIDE 30 MG: 10 INJECTION INTRAMUSCULAR; INTRAVENOUS at 07:53

## 2024-07-12 RX ADMIN — MIDAZOLAM 2 MG: 1 INJECTION INTRAMUSCULAR; INTRAVENOUS at 07:30

## 2024-07-12 RX ADMIN — ROCURONIUM BROMIDE 50 MG: 50 INJECTION, SOLUTION INTRAVENOUS at 07:36

## 2024-07-12 RX ADMIN — HYDROMORPHONE HYDROCHLORIDE 0.5 MG: 1 INJECTION, SOLUTION INTRAMUSCULAR; INTRAVENOUS; SUBCUTANEOUS at 08:16

## 2024-07-12 RX ADMIN — GABAPENTIN 300 MG: 300 CAPSULE ORAL at 06:37

## 2024-07-12 RX ADMIN — DEXAMETHASONE SODIUM PHOSPHATE 4 MG: 4 INJECTION, SOLUTION INTRA-ARTICULAR; INTRALESIONAL; INTRAMUSCULAR; INTRAVENOUS; SOFT TISSUE at 07:53

## 2024-07-12 RX ADMIN — ROCURONIUM BROMIDE 20 MG: 50 INJECTION, SOLUTION INTRAVENOUS at 08:45

## 2024-07-12 RX ADMIN — ROCURONIUM BROMIDE 30 MG: 50 INJECTION, SOLUTION INTRAVENOUS at 08:05

## 2024-07-12 RX ADMIN — ACETAMINOPHEN 975 MG: 325 TABLET, FILM COATED ORAL at 06:37

## 2024-07-12 RX ADMIN — OXYCODONE HYDROCHLORIDE 5 MG: 5 TABLET ORAL at 11:01

## 2024-07-12 RX ADMIN — MAGNESIUM SULFATE HEPTAHYDRATE 2 G: 40 INJECTION, SOLUTION INTRAVENOUS at 07:41

## 2024-07-12 RX ADMIN — ROCURONIUM BROMIDE 30 MG: 50 INJECTION, SOLUTION INTRAVENOUS at 08:57

## 2024-07-12 RX ADMIN — ONDANSETRON 4 MG: 2 INJECTION INTRAMUSCULAR; INTRAVENOUS at 07:53

## 2024-07-12 RX ADMIN — SODIUM CHLORIDE, POTASSIUM CHLORIDE, SODIUM LACTATE AND CALCIUM CHLORIDE: 600; 310; 30; 20 INJECTION, SOLUTION INTRAVENOUS at 07:30

## 2024-07-12 RX ADMIN — PROPOFOL 200 MCG/KG/MIN: 10 INJECTION, EMULSION INTRAVENOUS at 07:36

## 2024-07-12 RX ADMIN — FENTANYL CITRATE 100 MCG: 50 INJECTION INTRAMUSCULAR; INTRAVENOUS at 07:36

## 2024-07-12 RX ADMIN — SUGAMMADEX 200 MG: 100 INJECTION, SOLUTION INTRAVENOUS at 09:22

## 2024-07-12 RX ADMIN — LIDOCAINE HYDROCHLORIDE 100 MG: 20 INJECTION, SOLUTION INFILTRATION; PERINEURAL at 07:36

## 2024-07-12 RX ADMIN — SODIUM CHLORIDE, POTASSIUM CHLORIDE, SODIUM LACTATE AND CALCIUM CHLORIDE: 600; 310; 30; 20 INJECTION, SOLUTION INTRAVENOUS at 06:43

## 2024-07-12 RX ADMIN — Medication 2 G: at 07:30

## 2024-07-12 RX ADMIN — PROPOFOL 200 MG: 10 INJECTION, EMULSION INTRAVENOUS at 07:36

## 2024-07-12 ASSESSMENT — ACTIVITIES OF DAILY LIVING (ADL)
ADLS_ACUITY_SCORE: 35
ADLS_ACUITY_SCORE: 33
ADLS_ACUITY_SCORE: 35
ADLS_ACUITY_SCORE: 35

## 2024-07-12 NOTE — ANESTHESIA POSTPROCEDURE EVALUATION
Patient: Roger Law    Procedure: Procedure(s):  Robotic Right Inguinal Hernia Repair with Mesh       Anesthesia Type:  General    Note:  Disposition: Outpatient   Postop Pain Control: Uneventful            Sign Out: Well controlled pain   PONV: No   Neuro/Psych: Uneventful            Sign Out: Acceptable/Baseline neuro status   Airway/Respiratory: Uneventful            Sign Out: Acceptable/Baseline resp. status   CV/Hemodynamics: Uneventful            Sign Out: Acceptable CV status; No obvious hypovolemia; No obvious fluid overload   Other NRE: NONE   DID A NON-ROUTINE EVENT OCCUR? No           Last vitals:  Vitals Value Taken Time   /80 07/12/24 1030   Temp 35.4  C (95.72  F) 07/12/24 1014   Pulse 89 07/12/24 1040   Resp 18 07/12/24 1040   SpO2 96 % 07/12/24 1040   Vitals shown include unfiled device data.    Electronically Signed By: JUJU Enciso CRNA  July 12, 2024  10:45 AM

## 2024-07-12 NOTE — ANESTHESIA PROCEDURE NOTES
Airway       Patient location during procedure: OR       Procedure Start/Stop Times: 7/12/2024 7:38 AM  Staff -        CRNA: Vinnie Poole APRN CRNA       Performed By: CRNA  Consent for Airway        Urgency: elective  Indications and Patient Condition       Indications for airway management: tobias-procedural       Induction type:intravenous       Mask difficulty assessment: 1 - vent by mask    Final Airway Details       Final airway type: endotracheal airway       Successful airway: ETT - single  Endotracheal Airway Details        ETT size (mm): 7.5       Cuffed: yes       Successful intubation technique: video laryngoscopy       VL Blade Size: Lopez 3       Grade View of Cords: 1       Adjucts: stylet       Position: Center       Measured from: gums/teeth       Secured at (cm): 24       Bite block used: None    Post intubation assessment        Placement verified by: capnometry, equal breath sounds and chest rise        Number of attempts at approach: 1       Number of other approaches attempted: 0       Secured with: tape       Ease of procedure: easy       Dentition: Intact and Unchanged    Medication(s) Administered   Medication Administration Time: 7/12/2024 7:38 AM

## 2024-07-12 NOTE — DISCHARGE INSTRUCTIONS
Same Day Surgery Discharge Instructions  Special Precautions After Surgery - Adult    It is not unusual to feel lightheaded or faint, up to 24 hours after surgery or while taking pain medication.  If you have these symptoms; sit for a few minutes before standing and have someone assist you when getting up.  You should rest and relax for the next 24 hours and must have someone stay with you for at least 24 hours after your discharge.  DO NOT DRIVE any vehicle or operate mechanical equipment for 24 hours following the end of your surgery.  DO NOT DRIVE while taking narcotic pain medications that have been prescribed by your physician.  If you had a limb operated on, you must be able to use it fully to drive.  DO NOT drink alcoholic beverages for 24 hours following surgery or while taking prescription pain medication.  Drink clear liquids (apple juice, ginger ale, broth, 7-Up, etc.).  Progress to your regular diet as you feel able.  Any questions call your physician and do not make important decisions for 24 hours.                  Medications:  Acetaminophen (Tylenol):  Next dose: 75175 pm.  Ibuprofen (Motrin, Advil):  Next dose: 3:30 pm.    Follow the instructions on the bottle.       __________________________________________________________________________________________________________________________________  IMPORTANT NUMBERS:    [facility] Main Number:  [phone], [phone]  Pharmacy:  [phone]  Same Day Surgery:  [phone], Monday - Friday until 8:30 p.m.    [facility]:  [phone]       HOME CARE FOLLOWING ABDOMINAL SURGERY    INCISIONAL CARE:  Replace the bandage over your incision (or incisions) until all drainage stops, or if more comfortable to have in place.  If present, leave the steri-strips (white paper tapes) in place for 14 days after surgery.  If you have staples in your incision at the time of discharge, they will be removed at your follow-up  appointment.  If Dermabond (a type of skin glue) is present, leave in place until it wears/flakes off.     BATHING:  Avoid baths for 1 week after surgery.  Showers are okay.  You may wash your hair at any time.  Gently pat your incision dry after bathing.    ACTIVITY:  Light Activity -- you may immediately be up and about as tolerated.  Driving -- you may drive when comfortable and off narcotic pain medications (example: Norco, Percocet, Hydrocodone).  Light Work -- resume when comfortable off pain medications.  (If you can drive, you probably can work.)  Strenuous Work/Activity -- limit lifting to 20 pounds for 4 weeks.  Then, progressively increase with time.  Active Sports (running, biking, etc.) -- cautiously resume after 6 weeks.  Most importantly listen to your body.  If an activity causes pain or discomfort please stop and try in a few days or decrease your intensity.    DISCOMFORT:  Use pain medications as prescribed by your surgeon.  Take the pain medication with some food, when possible, to minimize side effects.  Expect gradual improvement.      Narcotic Pain Medications:  Do not drive, make important decisions or operate heavy machinery while on narcotic pain medications.  Narcotic pain medications can be associated with nausea, sleep disturbance, and constipation.  Unless directed otherwise, please take an over the counter stool softener (Colace 100mg twice a day) unless directed otherwise.  As soon as you are able to stop narcotic pain medications the better. Long term use of narcotics is associated with tolerance (the need for more medication for effect) and potential addiction.    DIET:  Return to diet you were on before surgery, unless you are given specific diet instructions.  Drink plenty of fluids.  While taking pain medications, increase dietary fiber or add a fiber supplementation like Metamucil or Citrucel to help prevent constipation - a possible side effect of pain  medications.    NAUSEA:  If nauseated from the anesthetic/pain meds; rest in bed, get up cautiously with assistance, and drink clear liquids (juice, tea, broth).    RETURN APPOINTMENT:  Schedule a follow-up visit 2-3 weeks after discharge from the hospital.  Office Phone: [phone]     CONTACT US IF THE FOLLOWING DEVELOPS:   1. A fever that is above 101     2. If there is a large amount of drainage, bleeding, or swelling.   3. Severe pain that is not relieved by your prescription.   4. Drainage that is thick, cloudy, yellow, green or white.   5. Any other questions not answered by  Frequently Asked Questions  sheet.      FREQUENTLY ASKED QUESTIONS:    Q:  How should my incision look?    A:  Normally your incision will appear slightly swollen with light redness directly along the incision itself as it heals.  It may feel like a bump or ridge as the healing/scarring happens, and over time (3-4 months) this bump or ridge feeling should slowly go away.  In general, clear or pink watery drainage can be normal at first as your incision heals, but should decrease over time.    Q:  How do I know if my incision is infected?  A:  Look at your incision for signs of infection, like redness around the incision spreading to surrounding skin, or drainage of cloudy or foul-smelling drainage.  If you feel warm, check your temperature to see if you are running a fever.    **If any of these things occur, please notify the nurse at our office.  We may need you to come into the office for an incision check.      Q:  How do I take care of my incision?  A:  If you have a dressing in place - Starting the day after surgery, replace the dressing 1-2 times a day until there is no further drainage from the incision.  At that time, a dressing is no longer needed.  Try to minimize tape on the skin if irritation is occurring at the tape sites.  If you have significant irritation from tape on the skin, please call the office to discuss other  method of dressing your incision.    Small pieces of tape called  steri-strips  may be present directly overlying your incision; these may be removed 10 days after surgery unless otherwise specified by your surgeon.  If these tapes start to loosen at the ends, you may trim them back until they fall off or are removed.    A:  If you had  Dermabond  tissue glue used as a dressing (this causes your incision to look shiny with a clear covering over it) - This type of dressing wears off with time and does not require more dressings over the top unless it is draining around the glue as it wears off.  Do not apply ointments or lotions over the incisions until the glue has completely worn off.    Q:  There is a piece of tape or a sticky  lead  still on my skin.  Can I remove this?  A:  Sometimes the sticky  leads  used for monitoring during surgery or for evaluation in the emergency department are not all removed while you are in the hospital.  These sometimes have a tab or metal dot on them.  You can easily remove these on your own, like taking off a band-aid.  If there is a gel substance under the  lead , simply wipe/clean it off with a washcloth or paper towel.      Q:  What can I do to minimize constipation (very hard stools, or lack of stools)?  A:  Stay well hydrated.  Increase your dietary fiber intake or take a fiber supplement -with plenty of water.  Walk around frequently.  You may consider an over-the-counter stool-softener.  Your Pharmacist can assist you with choosing one that is stocked at your pharmacy.  Constipation is also one of the most common side effects of pain medication.  If you are using pain medication, be pro-active and try to PREVENT problems with constipation by taking the steps above BEFORE constipation becomes a problem.    Q:  What do I do if I need more pain medications?  A:  Call the office to receive refills.  Be aware that certain pain meds cannot be called into a pharmacy and actually  require a paper prescription.  A change may be made in your pain med as you progress thru your recovery period or if you have side effects to certain meds.    --Pain meds are NOT refilled after 5pm on weekdays, and NOT AT ALL on the weekends, so please look ahead to prevent problems.      Q:  Why am I having a hard time sleeping now that I am at home?  A:  Many medications you receive while you are in the hospital can impact your sleep for a number of days after your surgery/hospitalization.  Decreased level of activity and naps during the day may also make sleeping at night difficult.  Try to minimize day-time naps, and get up frequently during the day to walk around your home during your recovery time.  Sleep aides may be of some help, but are not recommended for long-term use.      Q:  I am having some back discomfort.  What should I do?  A:  This may be related to certain positioning that was required for your surgery, extended periods of time in bed, or other changes in your overall activity level.  You may try ice, heat, acetaminophen, or ibuprofen to treat this temporarily.  Note that many pain medications have acetaminophen in them and would state this on the prescription bottle.  Be sure not to exceed the maximum of 4000mg per day of acetaminophen.     **If the pain you are having does not resolve, is severe, or is a flare of back pain you have had on other occasions prior to surgery, please contact your primary physician for further recommendations or for an appointment to be examined at their office.    Q:  Why am I having headaches?  A:  Headaches can be caused by many things:  caffeine withdrawal, use of pain meds, dehydration, high blood pressure, lack of sleep, over-activity/exhaustion, flare-up of usual migraine headaches.  If you feel this is related to muscle tension (a band-like feeling around the head, or a pressure at the low-back of the head) you may try ice or heat to this area.  You may need  to drink more fluids (try electrolyte drink like Gatorade), rest, or take your usual migraine medications.   **If your headaches do not resolve, worsen, are accompanied by other symptoms, or if your blood pressure is high, please call your primary physician for recommendation and/or examination.    Q:  I am unable to urinate.  What do I do?  A:  A small percentage of people can have difficulty urinating initially after surgery.  This includes being able to urinate only a very small amount at a time and feeling discomfort or pressure in the very low abdomen.  This is called  urinary retention , and is actually an urgent situation.  Proceed to your nearest Emergency department for evaluation (not an Urgent Care Center).  Sometimes the bladder does not work correctly after certain medications you receive during surgery, or related to certain procedures.  You may need to have a catheter placed until your bladder recovers.  When planning to go to an Emergency department, it may help to call the ER to let them know you are coming in for this problem after a surgery.  This may help you get in quicker to be evaluated.  **If you have symptoms of a urinary tract infection, please contact your primary physician for the proper evaluation and treatment.          If you have other questions, please call the office Monday thru Friday between 8am and 5pm to discuss with the nurse.  # [phone]    There is a surgeon ON CALL on weekday evenings and over the weekend in case of urgent need only, and may be contacted at the same number.    If you are having an emergency, call 911 or proceed to your nearest emergency department.

## 2024-07-12 NOTE — ANESTHESIA CARE TRANSFER NOTE
Patient: Roger Law    Procedure: Procedure(s):  Robotic Right Inguinal Hernia Repair with Mesh       Diagnosis: Reducible right inguinal hernia [K40.90]  Diagnosis Additional Information: No value filed.    Anesthesia Type:   General     Note:    Oropharynx: oropharynx clear of all foreign objects and spontaneously breathing  Level of Consciousness: drowsy  Oxygen Supplementation: face mask  Level of Supplemental Oxygen (L/min / FiO2): 10  Independent Airway: airway patency satisfactory and stable  Dentition: dentition unchanged  Vital Signs Stable: post-procedure vital signs reviewed and stable  Report to RN Given: handoff report given  Patient transferred to: PACU    Handoff Report: Identifed the Patient, Identified the Reponsible Provider, Reviewed the pertinent medical history, Discussed the surgical course, Reviewed Intra-OP anesthesia mangement and issues during anesthesia, Set expectations for post-procedure period and Allowed opportunity for questions and acknowledgement of understanding      Vitals:  Vitals Value Taken Time   BP 90/59 07/12/24 0946   Temp 35.9  C (96.62  F) 07/12/24 1000   Pulse 78 07/12/24 1000   Resp 20 07/12/24 1000   SpO2 96 % 07/12/24 1000   Vitals shown include unfiled device data.    Electronically Signed By: JUJU Enciso CRNA  July 12, 2024  10:01 AM

## 2024-07-12 NOTE — ADDENDUM NOTE
Addendum  created 07/12/24 1046 by Vinnie Poole APRN CRNA    Flowsheet accepted, Intraprocedure Flowsheets edited

## 2024-07-12 NOTE — OP NOTE
Operative Note     Pre-Operative Diagnosis: Right Inguinal Hernia    Post-Operative Diagnosis: Same     Procedure:  Robotic Right Inguinal Hernia Repair with Mesh     Surgeon: Ra Ashford DO    Assistant: Elliott Justice PA-C (needed for retraction, suction, assistance with closure)     Anesthesia: General Endotracheal Anesthesia, Local Anesthesia (1% Lidocaine with epinephrine, 0.25% Marcaine)     EBL: 30cc     Operative Date: 07/12/24      Indications: Roger Law presents with pain related to an enlarging bulge of the Right groin, and is found to have areducible, tender Right inguinal hernia on examination. He was counseled about the indications, risks, benefits and alternatives to surgery, and his questions were addressed. He understands and wishes to proceed.      Procedure: After informed consent was obtained, the patient was brought to the operating room and placed supine on the operating room table. Bilateral lower extremity sequential compression devices were placed and functioning prior to induction of anesthesia, which was then administered and included an endotracheal tube. A urinary catheter was not deemed necessary as the patient urinated just prior to transport to the operating room. The abdominal wall was prepped and draped in the standard fashion.     Intra-abdominal access was obtained using an open Diaz technique, in a supraumbilical site. Once intra-abdominal entry was visually established, an 8mm trochar was delivered and pneumoperitoneum administered. Exploration of the abdominal cavity confirmed a moderate indirect right inguinal hernia. There were no other concerning findings on exploration. Additional working ports were placed in the  bilateral  upper quadrant abdomen - and each measured 8mm. The patient was positioned in the Trendelenberg positon. The peritoneum was scored anterior to the hernia, and the preperitoneal space dissected carefully. The hernia sac was carefully  dissected free from the spermatic cord contents, taking care to avoid injuring the spermatic cord vessels and vas deferens.  A large cord lipoma was dissected free.  Once dissection appeared adequate, a Dextile mesh prosthesis was delivered intra-abdominal and situated in the preperitoneal space. Transfixion was then accomplished first at the pubic tubercle and once lateral using 3-0 Vicryl, and the repair was reassessed and appeared satisfactory.     Hemostasis was assured, and the peritoneum was closed using a continuous 2-0 Stratafix suture, the apex of the cord lipoma was incorporated into the peritoneal closure, and all needles removed from the abdominal cavity. Each of the trochars was then removed, and an instrument count (including laparotomy pads, sponges and needles) was performed and found to be correct.      The umbilical fascial defect was closed using 0-Vicryl suture. The skin incisions were closed using 4-0 Monocryl. The wounds were cleansed and dried, and dressed with sterile glue.      The patient tolerated the procedure well, was allowed to recover from anesthesia, extubated without incident and transferred to the Recovery Room in stable condition after verifying the location of both testes within the scrotum at the conclusion of the operation.     Ra Ashford DO on 7/12/2024 at 9:37 AM

## 2024-07-15 ENCOUNTER — OFFICE VISIT (OUTPATIENT)
Dept: ORTHOPEDICS | Facility: CLINIC | Age: 44
End: 2024-07-15
Payer: COMMERCIAL

## 2024-07-15 VITALS
WEIGHT: 218 LBS | SYSTOLIC BLOOD PRESSURE: 140 MMHG | BODY MASS INDEX: 29.53 KG/M2 | DIASTOLIC BLOOD PRESSURE: 106 MMHG | RESPIRATION RATE: 18 BRPM | HEIGHT: 72 IN | HEART RATE: 77 BPM

## 2024-07-15 DIAGNOSIS — G56.23 CUBITAL TUNNEL SYNDROME OF BOTH UPPER EXTREMITIES: Primary | ICD-10-CM

## 2024-07-15 PROCEDURE — 99214 OFFICE O/P EST MOD 30 MIN: CPT | Performed by: ORTHOPAEDIC SURGERY

## 2024-07-15 NOTE — LETTER
7/15/2024      Roger Law  [address]  [place] [address]      Dear Colleague,    Thank you for referring your patient, Roger Law, to the [facility]. Please see a copy of my visit note below.    Roger Law is a 43 year old male who is seen in follow up  for bilateral hand numbness and pain.  He has had this for about 3 years.   He has constant pain rated 4 out of 10 primarily in the small and ring fingers.  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  He has tried to sleep with his elbows more straight.  He did try an internet brace for this for a while.    He is used ice on the hands with some relief.  He has used Tylenol.  He does not think it has improved over the past 2 years.        Past Medical History:   Diagnosis Date     Closed fracture of left tibia and fibula with routine healing      Depressive disorder      Hypertension      Pneumonia due to 2019 novel coronavirus 04/2021     Pneumonia due to 2019 novel coronavirus 4/10/2021       Past Surgical History:   Procedure Laterality Date     DAVINCI XI HERNIORRHAPHY INGUINAL Right 7/12/2024    Procedure: Robotic Right Inguinal Hernia Repair with Mesh;  Surgeon: Ra Ashford DO;  Location: [facility] OR     MOUTH SURGERY  05/2024    Inspire implant for APOLINAR       Family History   Problem Relation Age of Onset     Hypertension Father      Depression Sister      Depression Sister        Social History     Socioeconomic History     Marital status: Single     Spouse name: Not on file     Number of children: 0     Years of education: Not on file     Highest education level: Not on file   Occupational History     Occupation: Psychotherapist     Comment: [employer]   Tobacco Use     Smoking status: Former     Current packs/day: 0.00     Average packs/day: 0.5 packs/day for 10.0 years (5.0 ttl pk-yrs)     Types: Cigarettes     Start date: 1/13/1996     Quit date: 1/13/2006      Years since quittin.5     Passive exposure: Past     Smokeless tobacco: Never   Vaping Use     Vaping status: Never Used   Substance and Sexual Activity     Alcohol use: Yes     Comment: 0-few drinks per month     Drug use: Never     Sexual activity: Not Currently     Partners: Female   Other Topics Concern     Not on file   Social History Narrative    Living with brother and sister.  One dog.     Social Determinants of Health     Financial Resource Strain: Not on file   Food Insecurity: Not on file   Transportation Needs: Not on file   Physical Activity: Not on file   Stress: Not on file   Social Connections: Not on file   Interpersonal Safety: Low Risk  (2024)    Interpersonal Safety      Do you feel physically and emotionally safe where you currently live?: Yes      Within the past 12 months, have you been hit, slapped, kicked or otherwise physically hurt by someone?: No      Within the past 12 months, have you been humiliated or emotionally abused in other ways by your partner or ex-partner?: No   Housing Stability: Not on file       Current Outpatient Medications   Medication Sig Dispense Refill     amitriptyline (ELAVIL) 50 MG tablet Take 2-3 tablets (100-150 mg) by mouth at bedtime 90 tablet 2     docusate sodium (COLACE) 100 MG capsule Take 1 capsule (100 mg) by mouth 2 times daily Take while on opiate to prevent constipation       fish oil-omega-3 fatty acids 1000 MG capsule Take 2 g by mouth daily       oxyCODONE (ROXICODONE) 5 MG tablet Take 1 tablet (5 mg) by mouth every 6 hours as needed for pain 12 tablet 0     Vitamin D3 (CHOLECALCIFEROL) 125 MCG (5000 UT) tablet Take 1 tablet by mouth daily         No Known Allergies    REVIEW OF SYSTEMS:  CONSTITUTIONAL:  NEGATIVE for fever, chills, change in weight, not feeling tired  SKIN:  NEGATIVE for worrisome rashes, no skin lumps, no skin ulcers and no non-healing wounds  EYES:  NEGATIVE for vision changes or irritation.  ENT/MOUTH:  NEGATIVE.  No  "hearing loss, no hoarseness, no difficulty swallowing.  RESP:  NEGATIVE. No cough or shortness of breath.  CV:  NEGATIVE for chest pain, palpitations or peripheral edema  GI:  NEGATIVE for nausea, abdominal pain, heartburn, or change in bowel habits  :  Negative. No dysuria, no hematuria  MUSCULOSKELETAL:  See HPI above  NEURO:  NEGATIVE . No headaches, no dizziness,  no numbness  ENDOCRINE:  NEGATIVE for temperature intolerance, skin/hair changes  HEME/ALLERGY/IMMUNE:  NEGATIVE for bleeding problems  PSYCHIATRIC:  NEGATIVE. no anxiety, no depression.     Exam:  Vitals: BP (!) 140/106   Pulse 77   Resp 18   Ht 1.816 m (5' 11.5\")   Wt 98.9 kg (218 lb)   BMI 29.98 kg/m    BMI= Body mass index is 29.98 kg/m .  Constitutional:  healthy, alert and no distress  Neuro: Alert and Oriented x 3, no focal defects.  Psych: Affect normal   Respiratory: Breathing not labored.  Cardiovascular: normal peripheral pulses  Lymph: no adenopathy  Skin: No rashes,worrisome lesions or skin problems  He has full range of motion of the neck without pains.  He does have positive Tinel over the ulnar nerve at both elbows at the cubital tunnels.  He has no subluxation of the ulnar nerves.  He has full range of motion of the wrist with negative Tinel over the median, ulnar, and radial nerves.  He has some tingling in the small and ring fingers bilaterally.  He has full range of motion of the wrist.  There is no triggering.  Strength is intact.    Assessment:  Bilateral cubital tunnel syndrome.  Plan: We discussed continued conservative treatment to avoid leaning on the elbows and avoid prolonged bending such as sleeping or being on the phone.  Also discussed surgical ulnar nerve release or transposition.  Surgery does require a long time to resolve the symptoms.  He has decided to continue conservative treatment at this time.    Again, thank you for allowing me to participate in the care of your patient.        Sincerely,        Andrés" Elliott Newman MD

## 2024-07-15 NOTE — PROGRESS NOTES
Roger aLw is a 43 year old male who is seen in follow up  for bilateral hand numbness and pain.  He has had this for about 3 years.   He has constant pain rated 4 out of 10 primarily in the small and ring fingers.  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  He has tried to sleep with his elbows more straight.  He did try an internet brace for this for a while.    He is used ice on the hands with some relief.  He has used Tylenol.  He does not think it has improved over the past 2 years.        Past Medical History:   Diagnosis Date    Closed fracture of left tibia and fibula with routine healing     Depressive disorder     Hypertension     Pneumonia due to 2019 novel coronavirus 2021    Pneumonia due to 2019 novel coronavirus 4/10/2021       Past Surgical History:   Procedure Laterality Date    DAVINCI XI HERNIORRHAPHY INGUINAL Right 2024    Procedure: Robotic Right Inguinal Hernia Repair with Mesh;  Surgeon: Ra Ashford DO;  Location: [facility]    MOUTH SURGERY  2024    Inspire implant for APOLINAR       Family History   Problem Relation Age of Onset    Hypertension Father     Depression Sister     Depression Sister        Social History     Socioeconomic History    Marital status: Single     Spouse name: Not on file    Number of children: 0    Years of education: Not on file    Highest education level: Not on file   Occupational History    Occupation: Psychotherapist     Comment: [employer]   Tobacco Use    Smoking status: Former     Current packs/day: 0.00     Average packs/day: 0.5 packs/day for 10.0 years (5.0 ttl pk-yrs)     Types: Cigarettes     Start date: 1996     Quit date: 2006     Years since quittin.5     Passive exposure: Past    Smokeless tobacco: Never   Vaping Use    Vaping status: Never Used   Substance and Sexual Activity    Alcohol use: Yes     Comment: 0-few drinks per month    Drug use: Never    Sexual activity: Not Currently      Partners: Female   Other Topics Concern    Not on file   Social History Narrative    Living with brother and sister.  One dog.     Social Determinants of Health     Financial Resource Strain: Not on file   Food Insecurity: Not on file   Transportation Needs: Not on file   Physical Activity: Not on file   Stress: Not on file   Social Connections: Not on file   Interpersonal Safety: Low Risk  (2/26/2024)    Interpersonal Safety     Do you feel physically and emotionally safe where you currently live?: Yes     Within the past 12 months, have you been hit, slapped, kicked or otherwise physically hurt by someone?: No     Within the past 12 months, have you been humiliated or emotionally abused in other ways by your partner or ex-partner?: No   Housing Stability: Not on file       Current Outpatient Medications   Medication Sig Dispense Refill    amitriptyline (ELAVIL) 50 MG tablet Take 2-3 tablets (100-150 mg) by mouth at bedtime 90 tablet 2    docusate sodium (COLACE) 100 MG capsule Take 1 capsule (100 mg) by mouth 2 times daily Take while on opiate to prevent constipation      fish oil-omega-3 fatty acids 1000 MG capsule Take 2 g by mouth daily      oxyCODONE (ROXICODONE) 5 MG tablet Take 1 tablet (5 mg) by mouth every 6 hours as needed for pain 12 tablet 0    Vitamin D3 (CHOLECALCIFEROL) 125 MCG (5000 UT) tablet Take 1 tablet by mouth daily         No Known Allergies    REVIEW OF SYSTEMS:  CONSTITUTIONAL:  NEGATIVE for fever, chills, change in weight, not feeling tired  SKIN:  NEGATIVE for worrisome rashes, no skin lumps, no skin ulcers and no non-healing wounds  EYES:  NEGATIVE for vision changes or irritation.  ENT/MOUTH:  NEGATIVE.  No hearing loss, no hoarseness, no difficulty swallowing.  RESP:  NEGATIVE. No cough or shortness of breath.  CV:  NEGATIVE for chest pain, palpitations or peripheral edema  GI:  NEGATIVE for nausea, abdominal pain, heartburn, or change in bowel habits  :  Negative. No dysuria, no  "hematuria  MUSCULOSKELETAL:  See HPI above  NEURO:  NEGATIVE . No headaches, no dizziness,  no numbness  ENDOCRINE:  NEGATIVE for temperature intolerance, skin/hair changes  HEME/ALLERGY/IMMUNE:  NEGATIVE for bleeding problems  PSYCHIATRIC:  NEGATIVE. no anxiety, no depression.     Exam:  Vitals: BP (!) 140/106   Pulse 77   Resp 18   Ht 1.816 m (5' 11.5\")   Wt 98.9 kg (218 lb)   BMI 29.98 kg/m    BMI= Body mass index is 29.98 kg/m .  Constitutional:  healthy, alert and no distress  Neuro: Alert and Oriented x 3, no focal defects.  Psych: Affect normal   Respiratory: Breathing not labored.  Cardiovascular: normal peripheral pulses  Lymph: no adenopathy  Skin: No rashes,worrisome lesions or skin problems  He has full range of motion of the neck without pains.  He does have positive Tinel over the ulnar nerve at both elbows at the cubital tunnels.  He has no subluxation of the ulnar nerves.  He has full range of motion of the wrist with negative Tinel over the median, ulnar, and radial nerves.  He has some tingling in the small and ring fingers bilaterally.  He has full range of motion of the wrist.  There is no triggering.  Strength is intact.    Assessment:  Bilateral cubital tunnel syndrome.  Plan: We discussed continued conservative treatment to avoid leaning on the elbows and avoid prolonged bending such as sleeping or being on the phone.  Also discussed surgical ulnar nerve release or transposition.  Surgery does require a long time to resolve the symptoms.  He has decided to continue conservative treatment at this time.    "

## 2024-07-30 ENCOUNTER — OFFICE VISIT (OUTPATIENT)
Dept: SURGERY | Facility: CLINIC | Age: 44
End: 2024-07-30
Payer: COMMERCIAL

## 2024-07-30 VITALS
HEIGHT: 71 IN | WEIGHT: 198.19 LBS | BODY MASS INDEX: 27.75 KG/M2 | HEART RATE: 52 BPM | SYSTOLIC BLOOD PRESSURE: 129 MMHG | DIASTOLIC BLOOD PRESSURE: 87 MMHG | TEMPERATURE: 97 F

## 2024-07-30 DIAGNOSIS — Z87.19 S/P RIGHT INGUINAL HERNIA REPAIR: Primary | ICD-10-CM

## 2024-07-30 DIAGNOSIS — Z98.890 S/P RIGHT INGUINAL HERNIA REPAIR: Primary | ICD-10-CM

## 2024-07-30 PROCEDURE — 99024 POSTOP FOLLOW-UP VISIT: CPT | Performed by: SURGERY

## 2024-07-30 ASSESSMENT — PAIN SCALES - GENERAL: PAINLEVEL: NO PAIN (0)

## 2024-07-30 NOTE — PROGRESS NOTES
"General Surgery Post Op    Pt returns for follow up visit s/p robotic right inguinal hernia repair on 7/12/2024.    Patient has been doing well, tolerating diet. Bowels moving well. Pain controlled. No issues with wound healing/redness/drainage. No fevers.      Physical exam: Vitals: /87 (BP Location: Right arm, Patient Position: Sitting, Cuff Size: Adult Large)   Pulse 52   Temp 97  F (36.1  C) (Tympanic)   Ht 1.816 m (5' 11.5\")   Wt 89.9 kg (198 lb 3.1 oz)   BMI 27.26 kg/m    BMI= Body mass index is 27.26 kg/m .    Exam:  Constitutional: healthy, alert, and no distress  Cardiovascular: negative  Respiratory: negative  Gastrointestinal: Abdomen soft, non-tender. BS normal. No masses, organomegaly  Left lateral incision with surrounding reactive erythema, consistent with friction type burn vs localized allergic reaction to adhesive    Path:  none    Assessment:     ICD-10-CM    1. S/P right inguinal hernia repair  Z98.890     Z87.19         Plan: Roger Law was seen for follow-up after robotic right inguinal hernia.  Patient is doing well and recovering without issue at this time.  We reviewed the pathology which was benign.  We discussed routine post-operative care of wounds including avoiding sun exposure to wounds to limit scarring, no need for overlying ointments, and weight restrictions going forward.  Patient was instructed to call with any questions or concerns.  Roger Law can follow-up on an as needed basis.        Ra Ashford, DO on 7/30/2024 at 7:10 AM      "

## 2024-07-30 NOTE — NURSING NOTE
"Initial /87 (BP Location: Right arm, Patient Position: Sitting, Cuff Size: Adult Large)   Pulse 52   Temp 97  F (36.1  C) (Tympanic)   Ht 1.816 m (5' 11.5\")   Wt 89.9 kg (198 lb 3.1 oz)   BMI 27.26 kg/m   Estimated body mass index is 27.26 kg/m  as calculated from the following:    Height as of this encounter: 1.816 m (5' 11.5\").    Weight as of this encounter: 89.9 kg (198 lb 3.1 oz). .  Ilana Shepard MA    "

## 2024-07-30 NOTE — LETTER
"7/30/2024      Roger Law  [address]  [place] [address]      Dear Colleague,    Thank you for referring your patient, Roger Law, to the [facility]. Please see a copy of my visit note below.    General Surgery Post Op    Pt returns for follow up visit s/p robotic right inguinal hernia repair on 7/12/2024.    Patient has been doing well, tolerating diet. Bowels moving well. Pain controlled. No issues with wound healing/redness/drainage. No fevers.      Physical exam: Vitals: /87 (BP Location: Right arm, Patient Position: Sitting, Cuff Size: Adult Large)   Pulse 52   Temp 97  F (36.1  C) (Tympanic)   Ht 1.816 m (5' 11.5\")   Wt 89.9 kg (198 lb 3.1 oz)   BMI 27.26 kg/m    BMI= Body mass index is 27.26 kg/m .    Exam:  Constitutional: healthy, alert, and no distress  Cardiovascular: negative  Respiratory: negative  Gastrointestinal: Abdomen soft, non-tender. BS normal. No masses, organomegaly  Left lateral incision with surrounding reactive erythema, consistent with friction type burn vs localized allergic reaction to adhesive    Path:  none    Assessment:     ICD-10-CM    1. S/P right inguinal hernia repair  Z98.890     Z87.19         Plan: Roger Law was seen for follow-up after robotic right inguinal hernia.  Patient is doing well and recovering without issue at this time.  We reviewed the pathology which was benign.  We discussed routine post-operative care of wounds including avoiding sun exposure to wounds to limit scarring, no need for overlying ointments, and weight restrictions going forward.  Patient was instructed to call with any questions or concerns.  Roger Law can follow-up on an as needed basis.        Ra Ashford, DO on 7/30/2024 at 7:10 AM        Again, thank you for allowing me to participate in the care of your patient.        Sincerely,        Ra Ashford, DO  "

## 2024-08-13 ENCOUNTER — MYC MEDICAL ADVICE (OUTPATIENT)
Dept: FAMILY MEDICINE | Facility: CLINIC | Age: 44
End: 2024-08-13
Payer: COMMERCIAL

## 2024-08-13 DIAGNOSIS — Z12.11 SCREEN FOR COLON CANCER: Primary | ICD-10-CM

## 2024-08-20 DIAGNOSIS — F51.04 CHRONIC INSOMNIA: ICD-10-CM

## 2024-08-22 ENCOUNTER — MYC MEDICAL ADVICE (OUTPATIENT)
Dept: FAMILY MEDICINE | Facility: CLINIC | Age: 44
End: 2024-08-22
Payer: COMMERCIAL

## 2024-08-22 ENCOUNTER — TELEPHONE (OUTPATIENT)
Dept: FAMILY MEDICINE | Facility: CLINIC | Age: 44
End: 2024-08-22
Payer: COMMERCIAL

## 2024-08-22 ENCOUNTER — TELEPHONE (OUTPATIENT)
Dept: GASTROENTEROLOGY | Facility: CLINIC | Age: 44
End: 2024-08-22
Payer: COMMERCIAL

## 2024-08-22 DIAGNOSIS — F51.04 CHRONIC INSOMNIA: ICD-10-CM

## 2024-08-22 PROBLEM — G47.33 OBSTRUCTIVE SLEEP APNEA: Status: ACTIVE | Noted: 2024-06-25

## 2024-08-22 NOTE — TELEPHONE ENCOUNTER
"Endoscopy Scheduling Screen    Have you had a positive Covid test in the last 14 days?  No    What is your communication preference for Instructions and/or Bowel Prep?   MyChart    What insurance is in the chart?  Other:  [employer]    Ordering/Referring Provider: Bertrand Sanchez   (If ordering provider performs procedure, schedule with ordering provider unless otherwise instructed. )    BMI: Estimated body mass index is 27.26 kg/m  as calculated from the following:    Height as of 7/30/24: 1.816 m (5' 11.5\").    Weight as of 7/30/24: 89.9 kg (198 lb 3.1 oz).     Sedation Ordered  moderate sedation.   If patient BMI > 50 do not schedule in ASC.    If patient BMI > 45 do not schedule at [facility].    Are you taking methadone or Suboxone?  No    Have you had difficulties, pain, or discomfort during past endoscopy procedures?  No    Are you taking any prescription medications for pain 3 or more times per week?   NO, No RN review required.    Do you have a history of malignant hyperthermia?  No    (Females) Are you currently pregnant?   No     Have you been diagnosed or told you have pulmonary hypertension?   No    Do you have an LVAD?  No    Have you been told you have moderate to severe sleep apnea?  Yes (RN Review required for scheduling unless scheduling in Hospital.)    Have you been told you have COPD, asthma, or any other lung disease?  No    Do you have any heart conditions?  No     Have you ever had or are you waiting for an organ transplant?  No. Continue scheduling, no site restrictions.    Have you had a stroke or transient ischemic attack (TIA aka \"mini stroke\" in the last 6 months?   No    Have you been diagnosed with or been told you have cirrhosis of the liver?   No    Are you currently on dialysis?   No    Do you need assistance transferring?   No    BMI: Estimated body mass index is 27.26 kg/m  as calculated from the following:    Height as of 7/30/24: 1.816 m (5' 11.5\").    Weight as of 7/30/24: 89.9 " kg (198 lb 3.1 oz).     Is patients BMI > 40 and scheduling location [facility]?  No    Do you take an injectable medication for weight loss or diabetes (excluding insulin)?  No    Do you take the medication Naltrexone?  No    Do you take blood thinners?  No       Prep   Are you currently on dialysis or do you have chronic kidney disease?  No    Do you have a diagnosis of diabetes?  No    Do you have a diagnosis of cystic fibrosis (CF)?  No    On a regular basis do you go 3 -5 days between bowel movements?  No    BMI > 40?  No    Preferred Pharmacy:    [facility] [address] [facility] [address] [facility]  [address] [facility] [address]  Phone: [phone] Fax: [phone]    Final Scheduling Details     Procedure scheduled  Colonoscopy    Surgeon:  Franchesca     Date of procedure:  9.16.24     Pre-OP / PAC:   No - Not required for this site.    Location  [facility] - Per exclusion criteria.    Sedation   Moderate Sedation - Per order.      Patient Reminders:   You will receive a call from a Nurse to review instructions and health history.  This assessment must be completed prior to your procedure.  Failure to complete the Nurse assessment may result in the procedure being cancelled.      On the day of your procedure, please designate an adult(s) who can drive you home stay with you for the next 24 hours. The medicines used in the exam will make you sleepy. You will not be able to drive.      You cannot take public transportation, ride share services, or non-medical taxi service without a responsible caregiver.  Medical transport services are allowed with the requirement that a responsible caregiver will receive you at your destination.  We require that drivers and caregivers are confirmed prior to your procedure.

## 2024-08-22 NOTE — TELEPHONE ENCOUNTER
Ronniehart message to schedule colonoscopy.    Thanks.   Alex Ding RN  [facility]

## 2024-08-28 NOTE — TELEPHONE ENCOUNTER
Fax for refill for pt's amitriptyline to [facility] pharmacy, sent earlier today.    Thanks!  Dimitris BARCLAY RN   [facility]     HBG 10.2 Taking iron once daily, iron rich foods  Repeat CBC with Ferritin ordered

## 2024-09-09 ENCOUNTER — TELEPHONE (OUTPATIENT)
Dept: GASTROENTEROLOGY | Facility: CLINIC | Age: 44
End: 2024-09-09
Payer: COMMERCIAL

## 2024-09-09 NOTE — TELEPHONE ENCOUNTER
Attempted to contact patient in order to complete pre assessment questions.     No answer. Left message to return call to [phone] option 4    Callback communication sent via [employer].    Xin Xiong RN

## 2024-09-09 NOTE — TELEPHONE ENCOUNTER
Pre visit planning completed.      Procedure details:    Patient scheduled for Colonoscopy on 9/16/24.     Arrival time: 0645. Procedure time 0730    Facility location: [facility]; [address] [place] [address] [place] [address]. Check in location: [facility].     Sedation type: Conscious sedation     Pre op exam needed? No.    Indication for procedure: screening      Chart review:     Electronic implanted devices? Yes: Inspire APOLINAR device    Recent diagnosis of diverticulitis within the last 6 weeks? No      Medication review:    Diabetic? No    Anticoagulants? No    Weight loss medication/injectable? No.    NSAIDS? No    Other medication HOLDING recommendations:  N/A      Prep for procedure:     Bowel prep recommendation: Standard Miralax  Due to: standard bowel prep.    Prep instructions sent via [facility] 8/22/24          Xin Xiong RN  Endoscopy Procedure Pre Assessment RN  [phone] option 4

## 2024-09-10 ENCOUNTER — TELEPHONE (OUTPATIENT)
Dept: GASTROENTEROLOGY | Facility: CLINIC | Age: 44
End: 2024-09-10
Payer: COMMERCIAL

## 2024-09-10 NOTE — TELEPHONE ENCOUNTER
Caller: Kiran    Reason for Reschedule/Cancellation   (please be detailed, any staff messages or encounters to note?): personal conflict      Prior to reschedule please review:  Ordering Provider: SANJEEV GRIFFIN   Sedation Determined: moderate  Does patient have any ASC Exclusions, please identify?: yes, APOLINAR      Notes on Cancelled Procedure:  Procedure: Lower Endoscopy [Colonoscopy]   Date: 9/16/24  Location: [facility]; [address] [place] [address]   Surgeon: SUN      Rescheduled: Yes,   Procedure: Lower Endoscopy [Colonoscopy]    Date: 11/14/24   Location: [facility]; [address] [place] [address]    Surgeon: PETYT   Sedation Level Scheduled  moderate ,  Reason for Sedation Level per order   Instructions updated and sent: yes, danielle     Does patient need PAC or Pre -Op Rescheduled? : no       Did you cancel or rescheduled an EUS procedure? No.

## 2024-09-12 NOTE — TELEPHONE ENCOUNTER
Caller: Roger Law     Reason for Reschedule/Cancellation   (please be detailed, any staff messages or encounters to note?): no       Prior to reschedule please review:  Ordering Provider: Laura  Sedation Determined: mod  Does patient have any ASC Exclusions, please identify?: n      Notes on Cancelled Procedure:  Procedure: Lower Endoscopy [Colonoscopy]   Date: 11/14  Location: [facility]; [address] [place] [address]   Surgeon: Cristo      Rescheduled: Yes,   Procedure: Lower Endoscopy [Colonoscopy]    Date: 11/18   Location: [facility]; [address] [place] [address]    Surgeon: Sun   Sedation Level Scheduled  mod ,  Reason for Sedation Level ordered   Instructions updated and sent: y     Does patient need PAC or Pre -Op Rescheduled? : no       Did you cancel or rescheduled an EUS procedure? No.

## 2024-09-22 ENCOUNTER — HEALTH MAINTENANCE LETTER (OUTPATIENT)
Age: 44
End: 2024-09-22

## 2024-10-31 ENCOUNTER — MYC MEDICAL ADVICE (OUTPATIENT)
Dept: GASTROENTEROLOGY | Facility: CLINIC | Age: 44
End: 2024-10-31
Payer: COMMERCIAL

## 2024-11-06 ENCOUNTER — MYC MEDICAL ADVICE (OUTPATIENT)
Dept: GASTROENTEROLOGY | Facility: CLINIC | Age: 44
End: 2024-11-06
Payer: COMMERCIAL

## 2024-11-06 NOTE — TELEPHONE ENCOUNTER
Caller: Roger Law   Reason for Reschedule/Cancellation (please be detailed, any staff messages or encounters to note?):     Pt called back right away to re-instate same date/time..       Rescheduled: Yes same date/time   Procedure: Lower Endoscopy [Colonoscopy]  Date: 11/18/2024  Location: [facility]; [facility] [address] [place] [address]   Surgeon: Franchesca  Sedation Level Scheduled  mod          Reason for Sedation Level per order   Prep/Instructions updated and sent: mychart     Does patient need PAC or Pre -Op Rescheduled? : n       Send In - basket message to Panc - Eliseo Pool if EUS procedure is canceled or rescheduled: [ N/A, YES or NO] n

## 2024-11-18 ENCOUNTER — HOSPITAL ENCOUNTER (OUTPATIENT)
Facility: CLINIC | Age: 44
Discharge: HOME | End: 2024-11-18
Attending: COLON & RECTAL SURGERY | Admitting: COLON & RECTAL SURGERY
Payer: COMMERCIAL

## 2024-11-18 VITALS
DIASTOLIC BLOOD PRESSURE: 98 MMHG | OXYGEN SATURATION: 78 % | HEART RATE: 59 BPM | RESPIRATION RATE: 10 BRPM | SYSTOLIC BLOOD PRESSURE: 132 MMHG

## 2024-11-18 LAB — COLONOSCOPY: NORMAL

## 2024-11-18 PROCEDURE — 45378 DIAGNOSTIC COLONOSCOPY: CPT | Performed by: COLON & RECTAL SURGERY

## 2024-11-18 PROCEDURE — G0121 COLON CA SCRN NOT HI RSK IND: HCPCS | Performed by: COLON & RECTAL SURGERY

## 2024-11-18 PROCEDURE — 99153 MOD SED SAME PHYS/QHP EA: CPT | Performed by: COLON & RECTAL SURGERY

## 2024-11-18 PROCEDURE — 250N000011 HC RX IP 250 OP 636: Performed by: COLON & RECTAL SURGERY

## 2024-11-18 PROCEDURE — G0500 MOD SEDAT ENDO SERVICE >5YRS: HCPCS | Performed by: COLON & RECTAL SURGERY

## 2024-11-18 RX ORDER — NALOXONE HYDROCHLORIDE 0.4 MG/ML
0.2 INJECTION, SOLUTION INTRAMUSCULAR; INTRAVENOUS; SUBCUTANEOUS
Status: DISCONTINUED | OUTPATIENT
Start: 2024-11-18 | End: 2024-11-18 | Stop reason: HOSPADM

## 2024-11-18 RX ORDER — FENTANYL CITRATE 50 UG/ML
INJECTION, SOLUTION INTRAMUSCULAR; INTRAVENOUS PRN
Status: DISCONTINUED | OUTPATIENT
Start: 2024-11-18 | End: 2024-11-18 | Stop reason: HOSPADM

## 2024-11-18 RX ORDER — ONDANSETRON 2 MG/ML
4 INJECTION INTRAMUSCULAR; INTRAVENOUS EVERY 6 HOURS PRN
Status: DISCONTINUED | OUTPATIENT
Start: 2024-11-18 | End: 2024-11-18 | Stop reason: HOSPADM

## 2024-11-18 RX ORDER — FLUMAZENIL 0.1 MG/ML
0.2 INJECTION, SOLUTION INTRAVENOUS
Status: DISCONTINUED | OUTPATIENT
Start: 2024-11-18 | End: 2024-11-18 | Stop reason: HOSPADM

## 2024-11-18 RX ORDER — LIDOCAINE 40 MG/G
CREAM TOPICAL
Status: DISCONTINUED | OUTPATIENT
Start: 2024-11-18 | End: 2024-11-18 | Stop reason: HOSPADM

## 2024-11-18 RX ORDER — ONDANSETRON 2 MG/ML
4 INJECTION INTRAMUSCULAR; INTRAVENOUS
Status: DISCONTINUED | OUTPATIENT
Start: 2024-11-18 | End: 2024-11-18 | Stop reason: HOSPADM

## 2024-11-18 RX ORDER — PROCHLORPERAZINE MALEATE 10 MG
10 TABLET ORAL EVERY 6 HOURS PRN
Status: DISCONTINUED | OUTPATIENT
Start: 2024-11-18 | End: 2024-11-18 | Stop reason: HOSPADM

## 2024-11-18 RX ORDER — ONDANSETRON 4 MG/1
4 TABLET, ORALLY DISINTEGRATING ORAL EVERY 6 HOURS PRN
Status: DISCONTINUED | OUTPATIENT
Start: 2024-11-18 | End: 2024-11-18 | Stop reason: HOSPADM

## 2024-11-18 RX ORDER — NALOXONE HYDROCHLORIDE 0.4 MG/ML
0.4 INJECTION, SOLUTION INTRAMUSCULAR; INTRAVENOUS; SUBCUTANEOUS
Status: DISCONTINUED | OUTPATIENT
Start: 2024-11-18 | End: 2024-11-18 | Stop reason: HOSPADM

## 2024-11-18 ASSESSMENT — ACTIVITIES OF DAILY LIVING (ADL)
ADLS_ACUITY_SCORE: 0

## 2024-11-18 NOTE — H&P
Pre-Endoscopy History and Physical   Roger Law MRN# [id number]   YOB: 1980 Age: 44 year old   Date of Procedure: 2024   Primary care provider: Bertrand Sanchez   Type of Endoscopy: colonoscopy   Reason for Procedure: screening   Type of Anesthesia Anticipated: Moderate Sedation   HPI:   Roger is a 44 year old male for screening colonoscopy.  He has never had a colonoscopy before.  He denies BRBPR, abdominal pain, nausea/vomiting, changes in bowel habits or unintentional weight loss.  He denies a FH of CRC.    No Known Allergies   Prior to Admission Medications   Prescriptions Last Dose Informant Patient Reported? Taking?   Vitamin D3 (CHOLECALCIFEROL) 125 MCG (5000 UT) tablet Past Week Self Yes Yes   Sig: Take 1 tablet by mouth daily   fish oil-omega-3 fatty acids 1000 MG capsule Past Week Self Yes Yes   Sig: Take 2 g by mouth daily      Facility-Administered Medications: None      Patient Active Problem List   Diagnosis    Chronic insomnia    Essential hypertension    Cubital tunnel syndrome of both upper extremities    Obstructive sleep apnea      Past Medical History:   Diagnosis Date    Closed fracture of left tibia and fibula with routine healing     Depressive disorder     Hypertension     Pneumonia due to 2019 novel coronavirus 2021    Pneumonia due to 2019 novel coronavirus 4/10/2021      Past Surgical History:   Procedure Laterality Date    DAVINCI XI HERNIORRHAPHY INGUINAL Right 2024    Procedure: Robotic Right Inguinal Hernia Repair with Mesh;  Surgeon: Ra Ashford DO;  Location: [facility] OR    MOUTH SURGERY  2024    Inspire implant for APOLINAR      Social History     Tobacco Use    Smoking status: Former     Current packs/day: 0.00     Average packs/day: 0.5 packs/day for 10.0 years (5.0 ttl pk-yrs)     Types: Cigarettes     Start date: 1996     Quit date: 2006     Years since quittin.8     Passive exposure: Past    Smokeless tobacco:  "Never   Substance Use Topics    Alcohol use: Yes     Comment: 0-few drinks per month      Family History   Problem Relation Age of Onset    Hypertension Father     Depression Sister     Depression Sister       PHYSICAL EXAM:   BP (!) 144/89   Pulse 64   SpO2 98%  Estimated body mass index is 27.26 kg/m  as calculated from the following:    Height as of 7/30/24: 1.816 m (5' 11.5\").    Weight as of 7/30/24: 89.9 kg (198 lb 3.1 oz).   RESP: lungs clear to auscultation - no rales, rhonchi or wheezes   CV: regular rates and rhythm   ASA Class 2 - Mild systemic disease    Assessment: 45 y/o gentleman for screening colonoscopy    Plan: Colonoscopy with moderate sedation.  Risks of the procedure were discussed including, but not limited to, bleeding, perforation and missed lesions.  Patient understands and is willing to proceed.    Elliott Sorensen MD ....................  11/18/2024   9:56 AM  Colon and Rectal Surgery Staff  [phone]     "

## (undated) DEVICE — STOCKING SLEEVE COMPRESSION CALF MED

## (undated) DEVICE — DAVINCI XI MONOPOLAR SCISSORS HOT SHEARS 8MM 470179

## (undated) DEVICE — Device

## (undated) DEVICE — RULER SURGICAL PLASTIC STRL LF CS628

## (undated) DEVICE — GOWN XLG DISP 9545

## (undated) DEVICE — SYR 30ML SLIP TIP W/O NDL 302833

## (undated) DEVICE — DRAPE TIBURON GENERAL ENDOSCOPY 9458

## (undated) DEVICE — DAVINCI XI DRAPE COLUMN 470341

## (undated) DEVICE — DAVINCI XI DRIVER NDL MEGA SUTURECUT 8MM EXT 471309

## (undated) DEVICE — GLOVE BIOGEL PI MICRO SZ 7.5 48575

## (undated) DEVICE — DAVINCI XI DRAPE ARM 470015

## (undated) DEVICE — SOL WATER IRRIG 1000ML BOTTLE 2F7114

## (undated) DEVICE — PREP CHLORAPREP 26ML TINTED ORANGE  260815

## (undated) DEVICE — ESU PENCIL W/COATED BLADE E2450H

## (undated) DEVICE — GLOVE BIOGEL PI MICRO INDICATOR UNDERGLOVE SZ 8.0 48980

## (undated) DEVICE — SU VICRYL 0 UR-6 27" J603H

## (undated) DEVICE — DAVINCI XI OBTURATOR BLADELESS 8MM 470359

## (undated) DEVICE — DRAPE U SPLIT 74X120" 29440

## (undated) DEVICE — KIT PATIENT POSITIONING PIGAZZI LATEX FREE 40580

## (undated) DEVICE — SU DERMABOND ADVANCED .7ML DNX12

## (undated) DEVICE — DAVINCI XI SEAL UNIVERSAL 5-12MM 470500

## (undated) DEVICE — SU STRATAFIX PDS PLUS 3-0 SPIRAL SH 15CM SXPP1B420

## (undated) DEVICE — SU VICRYL 3-0 SH 27" J316H

## (undated) DEVICE — DECANTER BAG 2002S

## (undated) DEVICE — DAVINCI XI ESU FORCE BIPOLAR 8MM 471405

## (undated) DEVICE — SU MONOCRYL 4-0 PS-2 18" UND Y496G

## (undated) DEVICE — DAVINCI HOT SHEARS TIP COVER  400180

## (undated) RX ORDER — OXYCODONE HYDROCHLORIDE 5 MG/1
TABLET ORAL
Status: DISPENSED
Start: 2024-07-12

## (undated) RX ORDER — PROPOFOL 10 MG/ML
INJECTION, EMULSION INTRAVENOUS
Status: DISPENSED
Start: 2024-07-12

## (undated) RX ORDER — CEFAZOLIN SODIUM/WATER 2 G/20 ML
SYRINGE (ML) INTRAVENOUS
Status: DISPENSED
Start: 2024-07-12

## (undated) RX ORDER — KETOROLAC TROMETHAMINE 30 MG/ML
INJECTION, SOLUTION INTRAMUSCULAR; INTRAVENOUS
Status: DISPENSED
Start: 2024-07-12

## (undated) RX ORDER — FENTANYL CITRATE 50 UG/ML
INJECTION, SOLUTION INTRAMUSCULAR; INTRAVENOUS
Status: DISPENSED
Start: 2024-07-12

## (undated) RX ORDER — ONDANSETRON 2 MG/ML
INJECTION INTRAMUSCULAR; INTRAVENOUS
Status: DISPENSED
Start: 2024-07-12

## (undated) RX ORDER — GLYCOPYRROLATE 0.2 MG/ML
INJECTION, SOLUTION INTRAMUSCULAR; INTRAVENOUS
Status: DISPENSED
Start: 2024-07-12

## (undated) RX ORDER — LIDOCAINE HYDROCHLORIDE AND EPINEPHRINE 10; 10 MG/ML; UG/ML
INJECTION, SOLUTION INFILTRATION; PERINEURAL
Status: DISPENSED
Start: 2024-07-12

## (undated) RX ORDER — LIDOCAINE HYDROCHLORIDE 10 MG/ML
INJECTION, SOLUTION EPIDURAL; INFILTRATION; INTRACAUDAL; PERINEURAL
Status: DISPENSED
Start: 2024-07-12

## (undated) RX ORDER — FENTANYL CITRATE 50 UG/ML
INJECTION, SOLUTION INTRAMUSCULAR; INTRAVENOUS
Status: DISPENSED
Start: 2024-11-18

## (undated) RX ORDER — HEPARIN SODIUM 5000 [USP'U]/.5ML
INJECTION, SOLUTION INTRAVENOUS; SUBCUTANEOUS
Status: DISPENSED
Start: 2024-07-12

## (undated) RX ORDER — DEXAMETHASONE SODIUM PHOSPHATE 4 MG/ML
INJECTION, SOLUTION INTRA-ARTICULAR; INTRALESIONAL; INTRAMUSCULAR; INTRAVENOUS; SOFT TISSUE
Status: DISPENSED
Start: 2024-07-12

## (undated) RX ORDER — GABAPENTIN 300 MG/1
CAPSULE ORAL
Status: DISPENSED
Start: 2024-07-12

## (undated) RX ORDER — ACETAMINOPHEN 325 MG/1
TABLET ORAL
Status: DISPENSED
Start: 2024-07-12

## (undated) RX ORDER — BUPIVACAINE HYDROCHLORIDE 5 MG/ML
INJECTION, SOLUTION EPIDURAL; INTRACAUDAL
Status: DISPENSED
Start: 2024-07-12

## (undated) RX ORDER — MAGNESIUM SULFATE HEPTAHYDRATE 40 MG/ML
INJECTION, SOLUTION INTRAVENOUS
Status: DISPENSED
Start: 2024-07-12